# Patient Record
Sex: FEMALE | Race: WHITE | NOT HISPANIC OR LATINO | Employment: OTHER | URBAN - NONMETROPOLITAN AREA
[De-identification: names, ages, dates, MRNs, and addresses within clinical notes are randomized per-mention and may not be internally consistent; named-entity substitution may affect disease eponyms.]

---

## 2017-01-26 ENCOUNTER — OFFICE VISIT (OUTPATIENT)
Dept: CARDIOLOGY | Facility: CLINIC | Age: 52
End: 2017-01-26

## 2017-01-26 VITALS
BODY MASS INDEX: 26.66 KG/M2 | WEIGHT: 160 LBS | SYSTOLIC BLOOD PRESSURE: 110 MMHG | DIASTOLIC BLOOD PRESSURE: 72 MMHG | HEART RATE: 70 BPM | HEIGHT: 65 IN

## 2017-01-26 DIAGNOSIS — I42.1 HYPERTROPHIC OBSTRUCTIVE CARDIOMYOPATHY (HCC): ICD-10-CM

## 2017-01-26 DIAGNOSIS — R55 NEUROCARDIOGENIC SYNCOPE: ICD-10-CM

## 2017-01-26 DIAGNOSIS — Z95.810 ICD (IMPLANTABLE CARDIOVERTER-DEFIBRILLATOR) IN PLACE: Primary | ICD-10-CM

## 2017-01-26 NOTE — PROGRESS NOTES
Electrophysiology icd Check           Current Outpatient Prescriptions:   •  verapamil SR (CALAN-SR) 180 MG CR tablet, Daily., Disp: , Rfl: 0     Vitals:    01/26/17 0852   BP: 110/72   Pulse: 70        Physical Exam       Company st. eunice  Mode dddr  Lower Rate 60 bpm  Upper rate 110 bpm       Thresholds  % pacing 56  Atrial Pacing o.87 Volts @ .5 ms  Atrial Sensing >5 mV  Atrial Impedence 550 Ohms    % pacing 4  Right Ventricular Pacing 0.75 Volts @ 0.5 ms  Right Ventricular Sensing 11.6 mV  Right Ventricular Impedence 330 Ohms           Tachy Rx  VT1 190 - 222 bpm  VT2 - - - bpm  VF > 222 bpm    Charge Time 11.5 sec  Shock Impedence 55 Ohms    Battery Voltage agnes Volts  Longevity 3.2 years        Episodes ffo    Reprogramming change sensitivity to 1 on atrial lead                           Comments       Stable ICD check, minimal svt continue verapamil

## 2017-07-27 ENCOUNTER — OFFICE VISIT (OUTPATIENT)
Dept: CARDIOLOGY | Facility: CLINIC | Age: 52
End: 2017-07-27

## 2017-07-27 VITALS
HEIGHT: 65 IN | DIASTOLIC BLOOD PRESSURE: 88 MMHG | SYSTOLIC BLOOD PRESSURE: 134 MMHG | BODY MASS INDEX: 25.99 KG/M2 | WEIGHT: 156 LBS

## 2017-07-27 DIAGNOSIS — I42.1 HYPERTROPHIC OBSTRUCTIVE CARDIOMYOPATHY (HCC): Primary | ICD-10-CM

## 2017-07-27 DIAGNOSIS — I47.29 NONSUSTAINED VENTRICULAR TACHYCARDIA (HCC): ICD-10-CM

## 2017-07-27 DIAGNOSIS — R55 NEUROCARDIOGENIC SYNCOPE: ICD-10-CM

## 2017-07-27 DIAGNOSIS — I47.1 SVT (SUPRAVENTRICULAR TACHYCARDIA) (HCC): ICD-10-CM

## 2017-07-27 DIAGNOSIS — Z95.810 ICD (IMPLANTABLE CARDIOVERTER-DEFIBRILLATOR) IN PLACE: ICD-10-CM

## 2017-07-27 PROBLEM — I47.10 SVT (SUPRAVENTRICULAR TACHYCARDIA): Status: ACTIVE | Noted: 2017-07-27

## 2017-07-27 PROCEDURE — 93289 INTERROG DEVICE EVAL HEART: CPT | Performed by: INTERNAL MEDICINE

## 2017-07-27 PROCEDURE — 99213 OFFICE O/P EST LOW 20 MIN: CPT | Performed by: INTERNAL MEDICINE

## 2017-07-27 NOTE — PROGRESS NOTES
"Subjective:   Smiley Sibley  1965  [phone]      07/27/2017    [facility] CARDIOLOGY    No Known Provider  [facility] [id number]    REFERRING DOCTOR: Dr Drake      Patient ID: Smiley Sibley is a 51 y.o. female.    Chief Complaint: HOCM, ICD, SVT      No Known Allergies    Current Outpatient Prescriptions:   •  verapamil SR (CALAN-SR) 180 MG CR tablet, Daily., Disp: , Rfl: 0    History of Present Illness  Patient is a 51 year old female with history of HOCM, neurocardiogenic syncope, DDD ICE, SVT and NSVT who presents today for follow up. SVT has been controlled on Verapamil with major issues other then when she works outside and gets dehydrated and then her heart rates increase.     No issues with chest pain, shortness of breath, fevers, chills, night sweats, PND, orthopnea or palpitations. No recent ER visits or hospital stays.      The following portions of the patient's history were reviewed and updated as appropriate: allergies, current medications, past family history, past medical history, past social history, past surgical history and problem list.    ROS   14 point ROS negative except as outlined in problem list, HPI and other parts of the note.    Procedures       Objective:       Vitals:    07/27/17 0916   BP: 134/88   BP Location: Left arm   Patient Position: Sitting   Weight: 156 lb (70.8 kg)   Height: 65\" (165.1 cm)       GENERAL: Well-developed, well-nourished patient in no acute distress.  HEENT: Normocephalic, atraumatic, PERRLA. Moist mucous membranes.  NECK: No JVD present at 30°. No carotid bruits auscultated.  LUNGS: Clear to auscultation.  CARDIOVASCULAR: Heart has a regular rate and rhythm. No murmurs, gallops or rubs noted.   ABDOMEN: Soft, nontender. Positive bowel sounds.  MUSCULOSKELETAL: No gross deformities. No clubbing, cyanosis, or lower extremity edema.  SKIN: Pink, warm  Neuro: Nonfocal exam. Gait intact  Ext: No edema or " bruising    The patient's old records including ambulatory rhythm recordings (ECGs, Holter/event monitor) were reviewed and discussed.      Lab Review:   No results found for this or any previous visit.     ICD check St Jack Normal RA and RV lead function. 46% RA paced, 6 % RV  < 1% AMS no AFIB. NSVT X1 10 beats        Diagnosis:   1. Hypertrophic obstructive cardiomyopathy  2. SVT (supraventricular tachycardia)  3. Nonsustained ventricular tachycardia  4. Neurocardiogenic syncope  5. ICD (implantable cardioverter-defibrillator) in place      Assessment & Plan:   1. HOCM s/p ICD no real events noted . NSVT X1 10 beats total  2. ST stable and controlled with Verapamil. Asked patient to stay hydrated and hope this helps her palps improve. If in long run issues consider AAD vs EP study / RFA  3. ICD check normal with no real events noted  4. Follow up in 6 mths or sooner as needed         CC: Riley Nassar,   07/27/17  9:43 AM      EMR Dragon/Transcription disclaimer:  Much of this encounter note is an electronic transcription/translation of spoken language to printed text. Electronic translation of spoken language may permit erroneous, or at times, nonsensical words or phrases to be inadvertently transcribed. Although I have reviewed the note for such errors, some may still exist.

## 2018-09-05 ENCOUNTER — TELEPHONE (OUTPATIENT)
Dept: CARDIOLOGY | Facility: CLINIC | Age: 53
End: 2018-09-05

## 2018-09-05 NOTE — TELEPHONE ENCOUNTER
Pt called to report that she had gotten shocked by her defibrillator.  She is feeling ok now. She said she has a monitor but it is unplugged.  Never has transmitted.  Made appt with José Antonio Vang tomorrow to see.  Pt hasn't been seen in office since July 2017.  Instructed pt if she does get another shock tonight that she will need to go to the ER.  Pt verbalized understanding.

## 2018-09-06 ENCOUNTER — OFFICE VISIT (OUTPATIENT)
Dept: CARDIOLOGY | Facility: CLINIC | Age: 53
End: 2018-09-06

## 2018-09-06 VITALS
BODY MASS INDEX: 24.83 KG/M2 | HEIGHT: 65 IN | OXYGEN SATURATION: 99 % | WEIGHT: 149 LBS | HEART RATE: 66 BPM | SYSTOLIC BLOOD PRESSURE: 110 MMHG | DIASTOLIC BLOOD PRESSURE: 70 MMHG

## 2018-09-06 DIAGNOSIS — Z95.810 ICD (IMPLANTABLE CARDIOVERTER-DEFIBRILLATOR) IN PLACE: ICD-10-CM

## 2018-09-06 DIAGNOSIS — I47.29 NONSUSTAINED VENTRICULAR TACHYCARDIA (HCC): Primary | ICD-10-CM

## 2018-09-06 DIAGNOSIS — I42.1 HYPERTROPHIC OBSTRUCTIVE CARDIOMYOPATHY (HCC): ICD-10-CM

## 2018-09-06 DIAGNOSIS — I47.1 SVT (SUPRAVENTRICULAR TACHYCARDIA) (HCC): ICD-10-CM

## 2018-09-06 PROCEDURE — 99214 OFFICE O/P EST MOD 30 MIN: CPT | Performed by: PHYSICIAN ASSISTANT

## 2018-09-06 PROCEDURE — 93283 PRGRMG EVAL IMPLANTABLE DFB: CPT | Performed by: PHYSICIAN ASSISTANT

## 2018-09-06 RX ORDER — VERAPAMIL HYDROCHLORIDE 240 MG/1
240 TABLET, FILM COATED, EXTENDED RELEASE ORAL DAILY
Qty: 30 TABLET | Refills: 11 | Status: SHIPPED | OUTPATIENT
Start: 2018-09-06 | End: 2018-09-14 | Stop reason: HOSPADM

## 2018-09-06 NOTE — PROGRESS NOTES
[facility] at [facility]   OFFICE NOTE      Smiley Sibley  1965  PCP: Provider, No Known    SUBJECTIVE:   Smiley Sibley is a 53 y.o. female seen for a follow up visit regarding the following: ICD shocks, SVT, HOCM, and St. Jack ICD    CC:ICD Shocks    Problem List:  1. HOCM   A)St. Jack ICD placement Dr. Brandon 2010   B)Echocardiogram 2016 HOCM, Normal EF Dr. Duncan.   2. SVT   A)ICD shock 2016   B)Verapamil started 2016 with rare breakthrough episodes of SVT  3. Hx of NCS      HPI:    now presents today for follow-up regarding history of hokum, SVT, ICD shocks, and St. Jack ICD interrogation.  She is a pleasant 53-year-old female who follows with Dr. Westbrook and silvio Westfall for history of hokum.  She states she had echo last year that was acceptable we do not have records of this at this time.  She has known history of SVT and remotely has been treated with verapamil at one point she is being considered for possible ablation procedure.  She reports over the past year she had been doing well up into the past few months she's had recurrent episodes of these palpitations.  These palpitations are described as sudden onset of diffuse crisp 8 that her heartbeat takes often goes bradycardia rapid.  She is in the past used vasovagal maneuvers to ward the arrhythmias and sometimes this does work.  Recently she has 2 episodes that occurred she could not stop him from happening and she had ICD shocks.  She denies any chest pain or chest tightness going on with exertion suggesting as pectoris.  She denies any heart failure symptoms.  She denies any change in overall health reports axis she's been doing very well and eating a healthy lifestyle and exercising without any symptoms.  During the shock she did not lose consciousness or she has not described any episodes of syncope events.  She presents today for further evaluation.        ROS:  Review of Symptoms:  General: no recent weight loss/gain,  weakness or fatigue  Skin: no rashes, lumps, or other skin changes  HEENT: no dizziness, lightheadedness, or vision changes  Respiratory: no cough or hemoptysis  Cardiovascular: + palpitations, and tachycardia  Gastrointestinal: no black/tarry stools or diarrhea  Urinary: no change in frequency or urgency  Peripheral Vascular: no claudication or leg cramps  Musculoskeletal: no muscle or joint pain/stiffness  Psychiatric: no depression or excessive stress  Neurological: no sensory or motor loss, no syncope  Hematologic: no anemia, easy bruising or bleeding  Endocrine: no thyroid problems, nor heat or cold intolerance    Cardiac PMH: (Old records have been reviewed and summarized below)      Past Medical History, Past Surgical History, Family history, Social History, and Medications were all reviewed with the patient today and updated as necessary.       Current Outpatient Prescriptions:   •  verapamil SR (CALAN-SR) 180 MG CR tablet, Daily., Disp: , Rfl: 0      No Known Allergies  Patient Active Problem List   Diagnosis   • Nonsustained ventricular tachycardia (CMS/HCC)   • Neurocardiogenic syncope   • Hypertrophic obstructive cardiomyopathy (CMS/HCC)   • Anxiety and depression   • Migraine headache   • ICD (implantable cardioverter-defibrillator) in place   • SVT (supraventricular tachycardia) (CMS/HCC)     Past Medical History:   Diagnosis Date   • Anxiety and depression 10/21/2016   • Hypertrophic obstructive cardiomyopathy (CMS/HCC) 10/21/2016   • Migraine headache 10/21/2016   • Neurocardiogenic syncope 10/21/2016   • Nonsustained ventricular tachycardia (CMS/HCC) 10/21/2016     Past Surgical History:   Procedure Laterality Date   • DILATATION AND CURETTAGE     • HYSTEROTOMY       No family history on file.  Social History   Substance Use Topics   • Smoking status: Never Smoker   • Smokeless tobacco: Not on file   • Alcohol use 0.6 - 1.2 oz/week     1 - 2 Glasses of wine per week      Comment: occas  "        PHYSICAL EXAM:    /70 (BP Location: Left arm, Patient Position: Sitting)   Pulse 66   Ht 165.1 cm (65\")   Wt 67.6 kg (149 lb)   SpO2 99%   BMI 24.79 kg/m²        Wt Readings from Last 5 Encounters:   09/06/18 67.6 kg (149 lb)   07/27/17 70.8 kg (156 lb)   01/26/17 72.6 kg (160 lb)       BP Readings from Last 5 Encounters:   09/06/18 110/70   07/27/17 134/88   01/26/17 110/72   11/03/16 105/70       General appearance - Alert, well appearing, and in no distress   Mental status - Affect appropriate to mood.  Eyes - Sclerae anicteric,  ENMT - Hearing grossly normal bilaterally, Dental hygiene good.  Neck - Carotids upstroke normal bilaterally, no bruits, no JVD.  Resp - Clear to auscultation, no wheezes, rales or rhonchi, symmetric air entry.  Heart - Normal rate, regular rhythm, normal S1, S2, no murmurs, rubs, clicks or gallops.  GI - Soft, nontender, nondistended, no masses or organomegaly.  Neurological - Grossly intact - normal speech, no focal findings  Musculoskeletal - No joint tenderness, deformity or swelling, no muscular tenderness noted.  Extremities - Peripheral pulses normal, no pedal edema, no clubbing or cyanosis.  Skin - Normal coloration and turgor.  Psych -  oriented to person, place, and time.    Medical problems and test results were reviewed with the patient today.     No results found for this or any previous visit (from the past 672 hour(s)).      EKG: (EKG has been independently visualized by me and summarized below)    ECG 12 Lead  Date/Time: 9/6/2018 4:27 PM  Performed by: TAMIKA MCCARTY  Authorized by: TAMIKA MCCARTY   Comparison: compared with previous ECG from 11/3/2016  Similar to previous ECG  Rhythm: sinus rhythm and paced  Rate: normal  Conduction: conduction normal  QRS axis: normal  Clinical impression: abnormal ECG            Device Interrogation:  Dual-chamber ICD. DDDR.  A paced 47%.  RV paced 7.8%.  P wave > 5.0 mV.  R wave 11.6 mV.  Atrial threshold " 0.7 V at 0.5 ms.  RV threshold 0.7 V at 0.5 ms.  Atrial impedance 460 ohms.  RV impedance 380 ohms.  Less 1% mode switch.  Batter voltage 23% with 1.9 years remaining.   Charge time 9.5 seconds.  Charge impedance 11 ohms.  She has had multiple episodes of SVT suggesting AVNRT.  She has received 2 shocks secondary to SVT. VT zone was lowered to 170 for improved tracking.     ASSESSMENT   1. 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  Will continue verapamil increased dose to 240 mg daily if blood pressure allows.  2. HOCM:  Obtain most recent echocardiogram results from Dr. Vuong's office.  She reports that her last echo was stated to be unchanged from previous echo 2016.  3. NCS:  No recurrent events of syncope.  Increase fluid intake, hydration  4. St. Jack ICD: Normal function.     PLAN  · We discussed the findings on the ICD interrogation revealing multiple episodes SVT.  The interrogation was reviewed with Dr. Nassar.  We offered the patient option of considering changing medication from verapamil to sotalol to reduce the events of recurrent SVT and ICD shocks.  We also offered her the option of considering EP study and ablation of SVT.  Risk and benefits of the procedures point detail to the patient.  She would like to be scheduled as soon as possible.  In the event she has recurrent shocks and recurrent SVT events we will discontinue verapamil and initiate sotalol 80 mg every 12 hours with a follow-up EKG in 48 hours. For now we will titrate Verapamil to 240 mg daily.   · Patient was scheduled for EP study and ablation of SVT as soon as possible.    9/6/2018  11:56 AM    Will Taj SEPULVEDA

## 2018-09-13 ENCOUNTER — HOSPITAL ENCOUNTER (OUTPATIENT)
Facility: HOSPITAL | Age: 53
Discharge: HOME | End: 2018-09-14
Attending: INTERNAL MEDICINE | Admitting: PHYSICIAN ASSISTANT

## 2018-09-13 LAB
ANION GAP SERPL CALCULATED.3IONS-SCNC: 4 MMOL/L (ref 3–11)
B-HCG UR QL: NEGATIVE
BUN BLD-MCNC: 18 MG/DL (ref 9–23)
BUN/CREAT SERPL: 19.8 (ref 7–25)
CALCIUM SPEC-SCNC: 9.6 MG/DL (ref 8.7–10.4)
CHLORIDE SERPL-SCNC: 106 MMOL/L (ref 99–109)
CO2 SERPL-SCNC: 30 MMOL/L (ref 20–31)
CREAT BLD-MCNC: 0.91 MG/DL (ref 0.6–1.3)
DEPRECATED RDW RBC AUTO: 43.4 FL (ref 37–54)
ERYTHROCYTE [DISTWIDTH] IN BLOOD BY AUTOMATED COUNT: 12.9 % (ref 11.3–14.5)
GFR SERPL CREATININE-BSD FRML MDRD: 65 ML/MIN/1.73
GLUCOSE BLD-MCNC: 100 MG/DL (ref 70–100)
HCT VFR BLD AUTO: 40.2 % (ref 34.5–44)
HGB BLD-MCNC: 13.1 G/DL (ref 11.5–15.5)
MCH RBC QN AUTO: 29.9 PG (ref 27–31)
MCHC RBC AUTO-ENTMCNC: 32.6 G/DL (ref 32–36)
MCV RBC AUTO: 91.8 FL (ref 80–99)
PLATELET # BLD AUTO: 264 10*3/MM3 (ref 150–450)
PMV BLD AUTO: 11.2 FL (ref 6–12)
POTASSIUM BLD-SCNC: 4 MMOL/L (ref 3.5–5.5)
RBC # BLD AUTO: 4.38 10*6/MM3 (ref 3.89–5.14)
SODIUM BLD-SCNC: 140 MMOL/L (ref 132–146)
WBC NRBC COR # BLD: 9.99 10*3/MM3 (ref 3.5–10.8)

## 2018-09-13 PROCEDURE — C1894 INTRO/SHEATH, NON-LASER: HCPCS | Performed by: INTERNAL MEDICINE

## 2018-09-13 PROCEDURE — 93287 PERI-PX DEVICE EVAL & PRGR: CPT | Performed by: INTERNAL MEDICINE

## 2018-09-13 PROCEDURE — C1730 CATH, EP, 19 OR FEW ELECT: HCPCS | Performed by: INTERNAL MEDICINE

## 2018-09-13 PROCEDURE — 85027 COMPLETE CBC AUTOMATED: CPT | Performed by: INTERNAL MEDICINE

## 2018-09-13 PROCEDURE — C1759 CATH, INTRA ECHOCARDIOGRAPHY: HCPCS | Performed by: INTERNAL MEDICINE

## 2018-09-13 PROCEDURE — 93613 INTRACARDIAC EPHYS 3D MAPG: CPT | Performed by: INTERNAL MEDICINE

## 2018-09-13 PROCEDURE — 25010000002 MIDAZOLAM PER 1 MG: Performed by: INTERNAL MEDICINE

## 2018-09-13 PROCEDURE — 94799 UNLISTED PULMONARY SVC/PX: CPT

## 2018-09-13 PROCEDURE — 93621 COMP EP EVL L PAC&REC C SINS: CPT | Performed by: INTERNAL MEDICINE

## 2018-09-13 PROCEDURE — 94760 N-INVAS EAR/PLS OXIMETRY 1: CPT

## 2018-09-13 PROCEDURE — 80048 BASIC METABOLIC PNL TOTAL CA: CPT | Performed by: INTERNAL MEDICINE

## 2018-09-13 PROCEDURE — 93623 PRGRMD STIMJ&PACG IV RX NFS: CPT | Performed by: INTERNAL MEDICINE

## 2018-09-13 PROCEDURE — G0378 HOSPITAL OBSERVATION PER HR: HCPCS

## 2018-09-13 PROCEDURE — 25010000002 FENTANYL CITRATE (PF) 100 MCG/2ML SOLUTION: Performed by: INTERNAL MEDICINE

## 2018-09-13 PROCEDURE — 81025 URINE PREGNANCY TEST: CPT | Performed by: INTERNAL MEDICINE

## 2018-09-13 PROCEDURE — 93653 COMPRE EP EVAL TX SVT: CPT | Performed by: INTERNAL MEDICINE

## 2018-09-13 PROCEDURE — 94660 CPAP INITIATION&MGMT: CPT

## 2018-09-13 PROCEDURE — 36415 COLL VENOUS BLD VENIPUNCTURE: CPT

## 2018-09-13 PROCEDURE — C1732 CATH, EP, DIAG/ABL, 3D/VECT: HCPCS | Performed by: INTERNAL MEDICINE

## 2018-09-13 PROCEDURE — 25010000002 ONDANSETRON PER 1 MG: Performed by: INTERNAL MEDICINE

## 2018-09-13 RX ORDER — MIDAZOLAM HYDROCHLORIDE 1 MG/ML
INJECTION INTRAMUSCULAR; INTRAVENOUS AS NEEDED
Status: DISCONTINUED | OUTPATIENT
Start: 2018-09-13 | End: 2018-09-13 | Stop reason: HOSPADM

## 2018-09-13 RX ORDER — ONDANSETRON 2 MG/ML
4 INJECTION INTRAMUSCULAR; INTRAVENOUS EVERY 6 HOURS PRN
Status: DISCONTINUED | OUTPATIENT
Start: 2018-09-13 | End: 2018-09-14 | Stop reason: HOSPADM

## 2018-09-13 RX ORDER — SOTALOL HYDROCHLORIDE 80 MG/1
80 TABLET ORAL EVERY 12 HOURS SCHEDULED
Status: DISCONTINUED | OUTPATIENT
Start: 2018-09-13 | End: 2018-09-14 | Stop reason: HOSPADM

## 2018-09-13 RX ORDER — LIDOCAINE HYDROCHLORIDE 10 MG/ML
INJECTION, SOLUTION INFILTRATION; PERINEURAL AS NEEDED
Status: DISCONTINUED | OUTPATIENT
Start: 2018-09-13 | End: 2018-09-13 | Stop reason: HOSPADM

## 2018-09-13 RX ORDER — ONDANSETRON 2 MG/ML
INJECTION INTRAMUSCULAR; INTRAVENOUS AS NEEDED
Status: DISCONTINUED | OUTPATIENT
Start: 2018-09-13 | End: 2018-09-13 | Stop reason: HOSPADM

## 2018-09-13 RX ORDER — ACETAMINOPHEN 325 MG/1
650 TABLET ORAL EVERY 4 HOURS PRN
Status: DISCONTINUED | OUTPATIENT
Start: 2018-09-13 | End: 2018-09-13 | Stop reason: HOSPADM

## 2018-09-13 RX ORDER — SODIUM CHLORIDE 0.9 % (FLUSH) 0.9 %
1-10 SYRINGE (ML) INJECTION AS NEEDED
Status: DISCONTINUED | OUTPATIENT
Start: 2018-09-13 | End: 2018-09-13 | Stop reason: HOSPADM

## 2018-09-13 RX ORDER — OXYCODONE HYDROCHLORIDE AND ACETAMINOPHEN 5; 325 MG/1; MG/1
1 TABLET ORAL EVERY 4 HOURS PRN
Status: DISCONTINUED | OUTPATIENT
Start: 2018-09-13 | End: 2018-09-14 | Stop reason: HOSPADM

## 2018-09-13 RX ORDER — NITROGLYCERIN 0.4 MG/1
0.4 TABLET SUBLINGUAL
Status: DISCONTINUED | OUTPATIENT
Start: 2018-09-13 | End: 2018-09-13 | Stop reason: HOSPADM

## 2018-09-13 RX ORDER — SODIUM CHLORIDE 9 MG/ML
INJECTION, SOLUTION INTRAVENOUS CONTINUOUS PRN
Status: COMPLETED | OUTPATIENT
Start: 2018-09-13 | End: 2018-09-13

## 2018-09-13 RX ORDER — PROMETHAZINE HYDROCHLORIDE 25 MG/ML
12.5 INJECTION, SOLUTION INTRAMUSCULAR; INTRAVENOUS EVERY 4 HOURS PRN
Status: DISCONTINUED | OUTPATIENT
Start: 2018-09-13 | End: 2018-09-13 | Stop reason: HOSPADM

## 2018-09-13 RX ORDER — FENTANYL CITRATE 50 UG/ML
INJECTION, SOLUTION INTRAMUSCULAR; INTRAVENOUS AS NEEDED
Status: DISCONTINUED | OUTPATIENT
Start: 2018-09-13 | End: 2018-09-13 | Stop reason: HOSPADM

## 2018-09-13 NOTE — H&P (VIEW-ONLY)
[facility] at [facility]   OFFICE NOTE      Smiley Sibley  1965  PCP: Provider, No Known    SUBJECTIVE:   Smiley Sibley is a 53 y.o. female seen for a follow up visit regarding the following: ICD shocks, SVT, HOCM, and St. Jack ICD    CC:ICD Shocks    Problem List:  1. HOCM   A)St. Jack ICD placement Dr. Brandon 2010   B)Echocardiogram 2016 HOCM, Normal EF Dr. Duncan.   2. SVT   A)ICD shock 2016   B)Verapamil started 2016 with rare breakthrough episodes of SVT  3. Hx of NCS      HPI:    now presents today for follow-up regarding history of hokum, SVT, ICD shocks, and St. Jack ICD interrogation.  She is a pleasant 53-year-old female who follows with Dr. Westbrook and silvio Westfall for history of hokum.  She states she had echo last year that was acceptable we do not have records of this at this time.  She has known history of SVT and remotely has been treated with verapamil at one point she is being considered for possible ablation procedure.  She reports over the past year she had been doing well up into the past few months she's had recurrent episodes of these palpitations.  These palpitations are described as sudden onset of diffuse crisp 8 that her heartbeat takes often goes bradycardia rapid.  She is in the past used vasovagal maneuvers to ward the arrhythmias and sometimes this does work.  Recently she has 2 episodes that occurred she could not stop him from happening and she had ICD shocks.  She denies any chest pain or chest tightness going on with exertion suggesting as pectoris.  She denies any heart failure symptoms.  She denies any change in overall health reports axis she's been doing very well and eating a healthy lifestyle and exercising without any symptoms.  During the shock she did not lose consciousness or she has not described any episodes of syncope events.  She presents today for further evaluation.        ROS:  Review of Symptoms:  General: no recent weight loss/gain,  weakness or fatigue  Skin: no rashes, lumps, or other skin changes  HEENT: no dizziness, lightheadedness, or vision changes  Respiratory: no cough or hemoptysis  Cardiovascular: + palpitations, and tachycardia  Gastrointestinal: no black/tarry stools or diarrhea  Urinary: no change in frequency or urgency  Peripheral Vascular: no claudication or leg cramps  Musculoskeletal: no muscle or joint pain/stiffness  Psychiatric: no depression or excessive stress  Neurological: no sensory or motor loss, no syncope  Hematologic: no anemia, easy bruising or bleeding  Endocrine: no thyroid problems, nor heat or cold intolerance    Cardiac PMH: (Old records have been reviewed and summarized below)      Past Medical History, Past Surgical History, Family history, Social History, and Medications were all reviewed with the patient today and updated as necessary.       Current Outpatient Prescriptions:   •  verapamil SR (CALAN-SR) 180 MG CR tablet, Daily., Disp: , Rfl: 0      No Known Allergies  Patient Active Problem List   Diagnosis   • Nonsustained ventricular tachycardia (CMS/HCC)   • Neurocardiogenic syncope   • Hypertrophic obstructive cardiomyopathy (CMS/HCC)   • Anxiety and depression   • Migraine headache   • ICD (implantable cardioverter-defibrillator) in place   • SVT (supraventricular tachycardia) (CMS/HCC)     Past Medical History:   Diagnosis Date   • Anxiety and depression 10/21/2016   • Hypertrophic obstructive cardiomyopathy (CMS/HCC) 10/21/2016   • Migraine headache 10/21/2016   • Neurocardiogenic syncope 10/21/2016   • Nonsustained ventricular tachycardia (CMS/HCC) 10/21/2016     Past Surgical History:   Procedure Laterality Date   • DILATATION AND CURETTAGE     • HYSTEROTOMY       No family history on file.  Social History   Substance Use Topics   • Smoking status: Never Smoker   • Smokeless tobacco: Not on file   • Alcohol use 0.6 - 1.2 oz/week     1 - 2 Glasses of wine per week      Comment: occas  "        PHYSICAL EXAM:    /70 (BP Location: Left arm, Patient Position: Sitting)   Pulse 66   Ht 165.1 cm (65\")   Wt 67.6 kg (149 lb)   SpO2 99%   BMI 24.79 kg/m²        Wt Readings from Last 5 Encounters:   09/06/18 67.6 kg (149 lb)   07/27/17 70.8 kg (156 lb)   01/26/17 72.6 kg (160 lb)       BP Readings from Last 5 Encounters:   09/06/18 110/70   07/27/17 134/88   01/26/17 110/72   11/03/16 105/70       General appearance - Alert, well appearing, and in no distress   Mental status - Affect appropriate to mood.  Eyes - Sclerae anicteric,  ENMT - Hearing grossly normal bilaterally, Dental hygiene good.  Neck - Carotids upstroke normal bilaterally, no bruits, no JVD.  Resp - Clear to auscultation, no wheezes, rales or rhonchi, symmetric air entry.  Heart - Normal rate, regular rhythm, normal S1, S2, no murmurs, rubs, clicks or gallops.  GI - Soft, nontender, nondistended, no masses or organomegaly.  Neurological - Grossly intact - normal speech, no focal findings  Musculoskeletal - No joint tenderness, deformity or swelling, no muscular tenderness noted.  Extremities - Peripheral pulses normal, no pedal edema, no clubbing or cyanosis.  Skin - Normal coloration and turgor.  Psych -  oriented to person, place, and time.    Medical problems and test results were reviewed with the patient today.     No results found for this or any previous visit (from the past 672 hour(s)).      EKG: (EKG has been independently visualized by me and summarized below)    ECG 12 Lead  Date/Time: 9/6/2018 4:27 PM  Performed by: TAMIKA MCCARTY  Authorized by: TAMIKA MCCARTY   Comparison: compared with previous ECG from 11/3/2016  Similar to previous ECG  Rhythm: sinus rhythm and paced  Rate: normal  Conduction: conduction normal  QRS axis: normal  Clinical impression: abnormal ECG            Device Interrogation:  Dual-chamber ICD. DDDR.  A paced 47%.  RV paced 7.8%.  P wave > 5.0 mV.  R wave 11.6 mV.  Atrial threshold " 0.7 V at 0.5 ms.  RV threshold 0.7 V at 0.5 ms.  Atrial impedance 460 ohms.  RV impedance 380 ohms.  Less 1% mode switch.  Batter voltage 23% with 1.9 years remaining.   Charge time 9.5 seconds.  Charge impedance 11 ohms.  She has had multiple episodes of SVT suggesting AVNRT.  She has received 2 shocks secondary to SVT. VT zone was lowered to 170 for improved tracking.     ASSESSMENT   1. 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  Will continue verapamil increased dose to 240 mg daily if blood pressure allows.  2. HOCM:  Obtain most recent echocardiogram results from Dr. Vuong's office.  She reports that her last echo was stated to be unchanged from previous echo 2016.  3. NCS:  No recurrent events of syncope.  Increase fluid intake, hydration  4. St. Jack ICD: Normal function.     PLAN  · We discussed the findings on the ICD interrogation revealing multiple episodes SVT.  The interrogation was reviewed with Dr. Nassar.  We offered the patient option of considering changing medication from verapamil to sotalol to reduce the events of recurrent SVT and ICD shocks.  We also offered her the option of considering EP study and ablation of SVT.  Risk and benefits of the procedures point detail to the patient.  She would like to be scheduled as soon as possible.  In the event she has recurrent shocks and recurrent SVT events we will discontinue verapamil and initiate sotalol 80 mg every 12 hours with a follow-up EKG in 48 hours. For now we will titrate Verapamil to 240 mg daily.   · Patient was scheduled for EP study and ablation of SVT as soon as possible.    9/6/2018  11:56 AM    Will Taj SEPULVEDA

## 2018-09-13 NOTE — PLAN OF CARE
Problem: Patient Care Overview  Goal: Plan of Care Review   09/13/18 8826   Coping/Psychosocial   Plan of Care Reviewed With patient   Plan of Care Review   Progress improving

## 2018-09-14 VITALS
OXYGEN SATURATION: 98 % | DIASTOLIC BLOOD PRESSURE: 70 MMHG | RESPIRATION RATE: 20 BRPM | WEIGHT: 150.57 LBS | BODY MASS INDEX: 25.09 KG/M2 | HEIGHT: 65 IN | SYSTOLIC BLOOD PRESSURE: 107 MMHG | HEART RATE: 70 BPM | TEMPERATURE: 98.4 F

## 2018-09-14 PROCEDURE — 93010 ELECTROCARDIOGRAM REPORT: CPT | Performed by: INTERNAL MEDICINE

## 2018-09-14 PROCEDURE — 93005 ELECTROCARDIOGRAM TRACING: CPT | Performed by: INTERNAL MEDICINE

## 2018-09-14 PROCEDURE — 99217 PR OBSERVATION CARE DISCHARGE MANAGEMENT: CPT | Performed by: INTERNAL MEDICINE

## 2018-09-14 PROCEDURE — G0378 HOSPITAL OBSERVATION PER HR: HCPCS

## 2018-09-14 RX ORDER — SOTALOL HYDROCHLORIDE 80 MG/1
80 TABLET ORAL EVERY 12 HOURS SCHEDULED
Qty: 60 TABLET | Refills: 5 | Status: SHIPPED | OUTPATIENT
Start: 2018-09-14 | End: 2022-12-28 | Stop reason: SDUPTHER

## 2018-09-14 RX ADMIN — SOTALOL HYDROCHLORIDE 80 MG: 80 TABLET ORAL at 08:36

## 2018-09-14 NOTE — PLAN OF CARE
Problem: Patient Care Overview  Goal: Plan of Care Review   09/14/18 0811   Coping/Psychosocial   Plan of Care Reviewed With patient   Plan of Care Review   Progress improving

## 2018-09-14 NOTE — PROGRESS NOTES
[facility] at [facility]  Cardiovascular Progress Note  Smiley Sibley  [id number]  1965    DATE OF ADMISSION: 9/13/2018  DATE OF FOLLOW UP:  9/14/18     Provider, No Known    Subjective:     Patient ID: Smiley Sibley is a 53 y.o. female.    Chief Complaint: SVT f/u     No Known Allergies    Current Facility-Administered Medications:   •  ondansetron (ZOFRAN) injection 4 mg, 4 mg, Intravenous, Q6H PRN, Maday, Maximino, DO  •  oxyCODONE-acetaminophen (PERCOCET) 5-325 MG per tablet 1 tablet, 1 tablet, Oral, Q4H PRN, Maday, Maximino, DO  •  sotalol (BETAPACE) tablet 80 mg, 80 mg, Oral, Q12H, Maday, Maximino, DO    History of Present Illness    Patient has not complaints this morning. She has ambulated with no issues. Sotalol was held last night due to hypotension.     ROS   14 point ROS negative except as outlined in problem list, HPI and other parts of the note.    Procedures       Objective:       Vitals:    09/13/18 1815 09/13/18 1949 09/13/18 2045 09/14/18 0309   BP: 95/59 94/52 98/57 100/67   BP Location:       Patient Position:       Pulse: 69 62 64 59   Resp:  20  20   Temp:  99 °F (37.2 °C)  99 °F (37.2 °C)   TempSrc:  Oral  Oral   SpO2: 95%      Weight:       Height:           Intake/Output Summary (Last 24 hours) at 09/14/18 0746  Last data filed at 09/14/18 0309   Gross per 24 hour   Intake              480 ml   Output                0 ml   Net              480 ml       GENERAL: Well-developed, well-nourished patient in no acute distress.  HEENT: Normocephalic, atraumatic, PERRLA. Moist mucous membranes.  NECK: No JVD present at 30°. No carotid bruits auscultated. R IJ with no hematoma   LUNGS: Clear to auscultation.  CARDIOVASCULAR: Heart has a regular rate and rhythm. No murmurs, gallops or rubs noted.   ABDOMEN: Soft, nontender. Positive bowel sounds.  MUSCULOSKELETAL: No gross deformities. No clubbing, cyanosis; R groin site with no hematoma.   EXT: pulses intact, no  swelling  SKIN: Pink, warm  Neuro: Nonfocal exam. Gait intact    The patient's old records including ambulatory rhythm recordings (ECGs, Holter/event monitor) were reviewed and discussed.      Lab Review:     Results from last 7 days  Lab Units 09/13/18  1046   SODIUM mmol/L 140   POTASSIUM mmol/L 4.0   CHLORIDE mmol/L 106   CO2 mmol/L 30.0   BUN mg/dL 18   CREATININE mg/dL 0.91   GLUCOSE mg/dL 100   CALCIUM mg/dL 9.6           Results from last 7 days  Lab Units 09/13/18  1046   WBC 10*3/mm3 9.99   HEMOGLOBIN g/dL 13.1   HEMATOCRIT % 40.2   PLATELETS 10*3/mm3 264         Assessment & Plan:     1. SVT/aflutter:  Recurrent episodes of SVT resulting in ICD shocks.   - S/p EPS with successful RFA of typical right atrial flutter. No ventricular arrhythmias induced   - Initiated on Sotalol 80mg BID yesterday and evening dose was held due to hypotension. Goal was to increase to 120mg BID but will be limited with BP. QTc is stable.     2. HOCM s/p ICD implant. No ventricular arrhythmias during EPS.     3. NCS:  No recurrent events of syncope.  Increase fluid intake, hydration    Start on Sotalol 80 mg BID this am, DC Verapamil. If no issues then will DC home about Noon. Xarelto 20 mg daily for 3 weeks then can stop.        Patient will be discharged home in stable condition. Follow-up in 10-12 weeks.       GREGORY Shell  09/14/18  7:46 AM    I, Maximino Nassar, have reviewed the note in full and agree with all aspects of the above including physical exam, assessment, labs and plan with changes made accordingly. Face to Face Time was spent with the patient.    Maximino Nassar DO  09/14/18  8:50 AM

## 2018-09-14 NOTE — PLAN OF CARE
Problem: Patient Care Overview  Goal: Plan of Care Review  Outcome: Ongoing (interventions implemented as appropriate)   09/14/18 0430   Coping/Psychosocial   Plan of Care Reviewed With patient   OTHER   Outcome Summary VSS. Paced on monitor. No complaints of pain or SOA at this time. Sotalol held r/t SBP 90s. Will continue to monitor.      Goal: Interprofessional Rounds/Family Conf  Outcome: Ongoing (interventions implemented as appropriate)

## 2018-09-14 NOTE — PROGRESS NOTES
Discharge Planning Assessment  [facility]     Patient Name: Smiley Sibley  MRN: [id number]  Today's Date: 9/14/2018    Admit Date: 9/13/2018          Discharge Needs Assessment     Row Name 09/14/18 0856       Living Environment    Lives With spouse    Name(s) of Who Lives With Patient Levar Sibley    Current Living Arrangements home/apartment/condo    Primary Care Provided by self    Provides Primary Care For no one    Family Caregiver if Needed spouse    Family Caregiver Names Levar Sibley    Quality of Family Relationships helpful;involved    Able to Return to Prior Arrangements yes       Resource/Environmental Concerns    Resource/Environmental Concerns none       Transition Planning    Patient/Family Anticipates Transition to home with family    Patient/Family Anticipated Services at Transition none    Transportation Anticipated family or friend will provide       Discharge Needs Assessment    Readmission Within the Last 30 Days no previous admission in last 30 days    Concerns to be Addressed no discharge needs identified;denies needs/concerns at this time    Equipment Currently Used at Home none    Anticipated Changes Related to Illness none    Equipment Needed After Discharge none            Discharge Plan     Row Name 09/14/18 0857       Plan    Plan home    Patient/Family in Agreement with Plan yes    Plan Comments Pt lives in [facility] with her . She is independent with all ADLs, denies use of any DME/HH. Pt reports her medications are covered by insurance. She reports her plan for discharge is to return home and denies all discharge needs.     Final Discharge Disposition Code 01 - home or self-care    Final Note home        Destination     No service coordination in this encounter.      Durable Medical Equipment     No service coordination in this encounter.      Dialysis/Infusion     No service coordination in this encounter.      Home Medical Care     No service coordination in this encounter.       Social Care     No service coordination in this encounter.                Demographic Summary     Row Name 09/14/18 0856       General Information    Admission Type observation    Referral Source physician    Reason for Consult discharge planning       Contact Information    Permission Granted to Share Info With ;family/designee    Contact Information Comments Levar Sibley  [phone]            Functional Status     Row Name 09/14/18 0856       Functional Status, IADL    Medications independent    Meal Preparation independent    Housekeeping independent    Laundry independent    Shopping independent       Mental Status    General Appearance WDL WDL       Mental Status Summary    Recent Changes in Mental Status/Cognitive Functioning no changes            Psychosocial    No documentation.           Abuse/Neglect    No documentation.           Legal    No documentation.           Substance Abuse    No documentation.           Patient Forms    No documentation.         Misty Hernadez RN

## 2018-12-17 ENCOUNTER — OFFICE VISIT (OUTPATIENT)
Dept: CARDIOLOGY | Facility: CLINIC | Age: 53
End: 2018-12-17

## 2018-12-17 VITALS
DIASTOLIC BLOOD PRESSURE: 72 MMHG | HEART RATE: 63 BPM | SYSTOLIC BLOOD PRESSURE: 112 MMHG | BODY MASS INDEX: 23.53 KG/M2 | WEIGHT: 141.2 LBS | HEIGHT: 65 IN

## 2018-12-17 DIAGNOSIS — Z95.810 ICD (IMPLANTABLE CARDIOVERTER-DEFIBRILLATOR) IN PLACE: ICD-10-CM

## 2018-12-17 DIAGNOSIS — I47.29 NONSUSTAINED VENTRICULAR TACHYCARDIA (HCC): ICD-10-CM

## 2018-12-17 DIAGNOSIS — I47.1 SVT (SUPRAVENTRICULAR TACHYCARDIA) (HCC): Primary | ICD-10-CM

## 2018-12-17 DIAGNOSIS — I42.1 HYPERTROPHIC OBSTRUCTIVE CARDIOMYOPATHY (HCC): ICD-10-CM

## 2018-12-17 PROCEDURE — 93283 PRGRMG EVAL IMPLANTABLE DFB: CPT | Performed by: INTERNAL MEDICINE

## 2018-12-17 PROCEDURE — 99213 OFFICE O/P EST LOW 20 MIN: CPT | Performed by: INTERNAL MEDICINE

## 2018-12-17 NOTE — PROGRESS NOTES
Subjective:   Smiley Sibley  1965    There is no work phone number on file.      12/17/2018    [facility]    Chloe Childers, [place]  [address] [id number]    REFERRING DOCTOR: LIAT      Patient ID: Smiley Sibley is a 53 y.o. female.    Chief Complaint:   Chief Complaint   Patient presents with   • Cardiomyopathy     Problem List:  1. HOCM              A)St. Jack ICD placement Dr. Brandon 2010              B)Echocardiogram 2016 HOCM, Normal EF Dr. Duncan.   2. SVT              A)ICD shock 2016              B)Verapamil started 2016 with rare breakthrough episodes of SVT              C/ S/p Typical Aflutter ablation 9/2018  3. Hx of NCS         No Known Allergies    Current Outpatient Medications:   •  sotalol (BETAPACE) 80 MG tablet, Take 1 tablet by mouth Every 12 (Twelve) Hours., Disp: 60 tablet, Rfl: 5    History of Present Illness  Patient for follow-up regarding history of HOCM, SVT, ICD shocks, and St. Jack ICD interrogation.  She is a pleasant 53-year-old female who follows with Dr. Westbrook and silvio Westfall for history of HOCM. Now s/p Aflutter ablation in Sept 2018 and doing better since that time and still at times getting some palpitations with couple times a week and lasting 30 secs to a minute.   She states she had echo last year that was acceptable we do not have records of this at this time.She had 2 episodes that occurred she could not stop him from happening and she had ICD shocks in August 2018.  She denies any chest pain or chest tightness going on with exertion suggesting as pectoris.  She denies any heart failure symptoms.  She denies any change in overall health reports axis she's been doing very well and eating a healthy lifestyle and exercising without any symptoms.  During the shock she did not lose consciousness or she has not described any episodes of syncope events.  Also, dealing with some anxiety since worried at times of getting  "shocked again.         No issues with chest pain, shortness of breath, fevers, chills, night sweats, PND, orthopnea. No recent ER visits or hospital stays.      The following portions of the patient's history were reviewed and updated as appropriate: allergies, current medications, past family history, past medical history, past social history, past surgical history and problem list.    ROS   14 point ROS negative except as outlined in problem list, HPI and other parts of the note.    Procedures       Objective:       Vitals:    12/17/18 1434   BP: 112/72   BP Location: Left arm   Patient Position: Sitting   Pulse: 63   Weight: 64 kg (141 lb 3.2 oz)   Height: 165.1 cm (65\")       GENERAL: Well-developed, well-nourished patient in no acute distress.  HEENT: Normocephalic, atraumatic, PERRLA. Moist mucous membranes.  NECK: No JVD present at 30°. No carotid bruits auscultated.  LUNGS: Clear to auscultation.  CARDIOVASCULAR: Heart has a regular rate and rhythm. No murmurs, gallops or rubs noted.   ABDOMEN: Soft, nontender. Positive bowel sounds.  MUSCULOSKELETAL: No gross deformities. No clubbing, cyanosis, or lower extremity edema.  SKIN: Pink, warm  Neuro: Nonfocal exam. Gait intact  Ext: No edema or bruising  ICD site ok    The patient's old records including ambulatory rhythm recordings (ECGs, Holter/event monitor) were reviewed and discussed.      Lab Review:   Results for orders placed or performed during the hospital encounter of 09/13/18   CBC (No Diff)   Result Value Ref Range    WBC 9.99 3.50 - 10.80 10*3/mm3    RBC 4.38 3.89 - 5.14 10*6/mm3    Hemoglobin 13.1 11.5 - 15.5 g/dL    Hematocrit 40.2 34.5 - 44.0 %    MCV 91.8 80.0 - 99.0 fL    MCH 29.9 27.0 - 31.0 pg    MCHC 32.6 32.0 - 36.0 g/dL    RDW 12.9 11.3 - 14.5 %    RDW-SD 43.4 37.0 - 54.0 fl    MPV 11.2 6.0 - 12.0 fL    Platelets 264 150 - 450 10*3/mm3   Basic Metabolic Panel   Result Value Ref Range    Glucose 100 70 - 100 mg/dL    BUN 18 9 - 23 mg/dL    " Creatinine 0.91 0.60 - 1.30 mg/dL    Sodium 140 132 - 146 mmol/L    Potassium 4.0 3.5 - 5.5 mmol/L    Chloride 106 99 - 109 mmol/L    CO2 30.0 20.0 - 31.0 mmol/L    Calcium 9.6 8.7 - 10.4 mg/dL    eGFR Non African Amer 65 >60 mL/min/1.73    BUN/Creatinine Ratio 19.8 7.0 - 25.0    Anion Gap 4.0 3.0 - 11.0 mmol/L   Pregnancy, Urine - Urine, Clean Catch   Result Value Ref Range    HCG, Urine QL Negative Negative     PROCEDURE EP 9/13/2018  1. SVT, typical isthmus dependent counterclockwise right atrial flutter with radiofrequency ablation along the CTI line with bidirectional block. The typical appearing atrial flutter that I induced was at a rate on 150 bpm on Isoprul and degenerated to atrial fibrillation.        2.   No ventricular arrhythmias induced with EP study.        3. DDD ICD Device Check        RECOMMENDATIONS:  1. The patient will be monitored on telemetry and if no issues will be discharged tomorrow AM  2. Start on Sotlol 80 mg BID and try to increase to 120 mg BID if can tolerate.  Discontinue verapamil.  I could not induce any atrial or ventricular arrhythmias with rapid rates up to 230 bpm that caused the patient to have inappropriate shocks.  This was even on up to 4 µg of Isuprel.  Uncertain if this was a dual tachycardia causing the shock versus atrial arrhythmias and rapid rates with inability to induce today during EP study.     ST FRACISCO DDD ICD  RA 94%, RV 25%  Normal check. < 1% AMS. NO VT, VF noted.         Diagnosis:   1. SVT (supraventricular tachycardia) (CMS/HCC)  2. Nonsustained ventricular tachycardia (CMS/HCC)  3. ICD (implantable cardioverter-defibrillator) in place  4. Hypertrophic obstructive cardiomyopathy (CMS/HCC)      Assessment & Plan:   1. SVT/Aflutter:  Recurrent episodes of SVT resulting in ICD shocks.   - S/p EPS with successful RFA of typical right atrial flutter. No ventricular arrhythmias induced   - Initiated on Sotalol 80mg BID. QTc is stable. At times with some  palpitations still but overall better. If recurrent and worsens in duration consider changing over to Tikosyn since cant tolerate sotalol 120 mg due to lower BPs     2. HOCM s/p ICD implant. No ventricular arrhythmias during EPS.      3. Neurocardiogenic Syncope:  No recurrent events of syncope.  Increase fluid intake, hydration    4. Follow up in 4 mths or sooner as needed. Ok to see Will           Maximino Nassar DO  12/17/18  3:12 PM      EMR Dragon/Transcription disclaimer:  This note was created with the use of Dragon.

## 2019-02-07 ENCOUNTER — TELEPHONE (OUTPATIENT)
Dept: CARDIOLOGY | Facility: CLINIC | Age: 54
End: 2019-02-07

## 2019-02-07 ENCOUNTER — CLINICAL SUPPORT NO REQUIREMENTS (OUTPATIENT)
Dept: CARDIOLOGY | Facility: CLINIC | Age: 54
End: 2019-02-07

## 2019-02-07 DIAGNOSIS — I42.1 HYPERTROPHIC OBSTRUCTIVE CARDIOMYOPATHY (HCC): ICD-10-CM

## 2019-02-07 DIAGNOSIS — I47.29 NONSUSTAINED VENTRICULAR TACHYCARDIA (HCC): ICD-10-CM

## 2019-02-07 PROCEDURE — 93296 REM INTERROG EVL PM/IDS: CPT | Performed by: PHYSICIAN ASSISTANT

## 2019-02-07 PROCEDURE — 93295 DEV INTERROG REMOTE 1/2/MLT: CPT | Performed by: PHYSICIAN ASSISTANT

## 2019-02-07 NOTE — TELEPHONE ENCOUNTER
Called pt due to [facility]lin home monitor didn't send scheduled reading.  Left message with my name and number for pt to return my call.

## 2019-04-22 ENCOUNTER — OFFICE VISIT (OUTPATIENT)
Dept: CARDIOLOGY | Facility: CLINIC | Age: 54
End: 2019-04-22

## 2019-04-22 VITALS
SYSTOLIC BLOOD PRESSURE: 104 MMHG | HEIGHT: 65 IN | DIASTOLIC BLOOD PRESSURE: 68 MMHG | BODY MASS INDEX: 23.49 KG/M2 | HEART RATE: 60 BPM | WEIGHT: 141 LBS

## 2019-04-22 DIAGNOSIS — I42.1 HYPERTROPHIC OBSTRUCTIVE CARDIOMYOPATHY (HCC): Primary | ICD-10-CM

## 2019-04-22 DIAGNOSIS — I47.1 SVT (SUPRAVENTRICULAR TACHYCARDIA) (HCC): ICD-10-CM

## 2019-04-22 DIAGNOSIS — Z95.810 ICD (IMPLANTABLE CARDIOVERTER-DEFIBRILLATOR) IN PLACE: ICD-10-CM

## 2019-04-22 DIAGNOSIS — I47.29 NONSUSTAINED VENTRICULAR TACHYCARDIA (HCC): ICD-10-CM

## 2019-04-22 PROCEDURE — 93280 PM DEVICE PROGR EVAL DUAL: CPT | Performed by: PHYSICIAN ASSISTANT

## 2019-04-22 PROCEDURE — 99213 OFFICE O/P EST LOW 20 MIN: CPT | Performed by: PHYSICIAN ASSISTANT

## 2019-04-22 NOTE — PROGRESS NOTES
[facility]   OFFICE NOTE      Smiley Sibely  1965  PCP: Chloe Childers PA    SUBJECTIVE:   Smiley Sibley is a 53 y.o. female seen for a follow up visit regarding the following:     CC:SVT    Problem List:  1. HOCM              A)St. Jack ICD placement Dr. Brandon 2010              B)Echocardiogram 2016 HOCM, Normal EF Dr. Duncan.   2. SVT              A)ICD shock 2016              B)Verapamil started 2016 with rare breakthrough episodes of SVT              C/ S/p Typical Aflutter ablation 9/2018  3. Hx of NCS  4. Migraine headaches.         HPI:   The patient is a pleasant 53-year-old female presents today for follow-up regarding history of SVT, remote atrial flutter ablation, neurocardiac syncope, St. Jack ICD and HOCM.  The patient had a EP study and RFA of typical atrial flutter by Dr. May September 2018.  However the patient developed recurrent SVT which resulted in an ICD shock.  In view of this finding the patient was placed on sotalol therapy.  She has been stable on the medication with no recurrent episodes of ICD shocks.  She has a chest pain or chest tightness suggesting his pectoris.  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  She denies any sudden dizziness, near syncope syncope or ICD shocks.  She reports that she does check in with Dr. Cardona's office on a regular basis and recently saw them.  She states that she does rarely have a palpitation but is very brief in nature lasting few seconds and resolves on its own.  She would like to consider coming off the sotalol if possible and has thought about this in the past.        ROS:  Review of Symptoms:  General: no recent weight loss/gain, weakness or fatigue  Skin: no rashes, lumps, or other skin changes  HEENT: no dizziness, lightheadedness, or vision changes  Respiratory: no cough or hemoptysis  Cardiovascular: + palpitations, and tachycardia  Gastrointestinal: no  black/tarry stools or diarrhea  Urinary: no change in frequency or urgency  Peripheral Vascular: no claudication or leg cramps  Musculoskeletal: no muscle or joint pain/stiffness  Psychiatric: no depression or excessive stress  Neurological: no sensory or motor loss, no syncope  Hematologic: no anemia, easy bruising or bleeding  Endocrine: no thyroid problems, nor heat or cold intolerance    Cardiac PMH: (Old records have been reviewed and summarized below)      Past Medical History, Past Surgical History, Family history, Social History, and Medications were all reviewed with the patient today and updated as necessary.       Current Outpatient Medications:   •  sotalol (BETAPACE) 80 MG tablet, Take 1 tablet by mouth Every 12 (Twelve) Hours., Disp: 60 tablet, Rfl: 5      No Known Allergies  Patient Active Problem List   Diagnosis   • Nonsustained ventricular tachycardia (CMS/HCC)   • Neurocardiogenic syncope   • Hypertrophic obstructive cardiomyopathy (CMS/HCC)   • Anxiety and depression   • Migraine headache   • ICD (implantable cardioverter-defibrillator) in place   • SVT (supraventricular tachycardia) (CMS/HCC)     Past Medical History:   Diagnosis Date   • Abnormal ECG 1998   • Anxiety and depression 10/21/2016   • Arrhythmia    • Congenital heart disease 1998   • Coronary artery disease    • Heart murmur 1965   • Hyperlipidemia 2018   • Hypertrophic obstructive cardiomyopathy (CMS/HCC) 10/21/2016   • Hypertrophic obstructive cardiomyopathy (CMS/HCC)    • Migraine headache 10/21/2016   • Mitral valve prolapse 1998   • Neurocardiogenic syncope 10/21/2016   • Nonsustained ventricular tachycardia (CMS/HCC) 10/21/2016   • Sleep apnea 2013   • Valvular disease 1998     Past Surgical History:   Procedure Laterality Date   • ABLATION OF DYSRHYTHMIC FOCUS  09/13/2018   • CARDIAC CATHETERIZATION  2010   • CARDIAC DEFIBRILLATOR PLACEMENT  07/29/2010   • CARDIAC ELECTROPHYSIOLOGY PROCEDURE N/A 9/13/2018    Procedure:  "Ablation SVT;  Surgeon: Maximino Nassar DO;  Location: [facility] INVASIVE LOC[facility];  Service: Cardiovascular   • DILATATION AND CURETTAGE     • HYSTEROTOMY     • INSERT / REPLACE / REMOVE PACEMAKER  07/29/2010     Family History   Problem Relation Age of Onset   • Arrhythmia Brother    • Heart disease Brother    • Arrhythmia Mother    • Heart disease Mother      Social History     Tobacco Use   • Smoking status: Never Smoker   • Smokeless tobacco: Never Used   Substance Use Topics   • Alcohol use: Yes     Alcohol/week: 0.6 - 1.2 oz     Types: 1 - 2 Glasses of wine per week     Comment: 3-4 times/year         PHYSICAL EXAM:    /68 (BP Location: Left arm, Patient Position: Sitting)   Pulse 60   Ht 165.1 cm (65\")   Wt 64 kg (141 lb)   BMI 23.46 kg/m²        Wt Readings from Last 5 Encounters:   04/22/19 64 kg (141 lb)   12/17/18 64 kg (141 lb 3.2 oz)   09/13/18 68.3 kg (150 lb 9.2 oz)   09/06/18 67.6 kg (149 lb)   07/27/17 70.8 kg (156 lb)       BP Readings from Last 5 Encounters:   04/22/19 104/68   12/17/18 112/72   09/14/18 107/70   09/06/18 110/70   07/27/17 134/88       General appearance - Alert, well appearing, and in no distress   Mental status - Affect appropriate to mood.  Eyes - Sclerae anicteric,  ENMT - Hearing grossly normal bilaterally, Dental hygiene good.  Neck - Carotids upstroke normal bilaterally, no bruits, no JVD.  Resp - Clear to auscultation, no wheezes, rales or rhonchi, symmetric air entry.  Heart - Normal rate, regular rhythm, normal S1, S2, no murmurs, rubs, clicks or gallops.  GI - Soft, nontender, nondistended, no masses or organomegaly.  Neurological - Grossly intact - normal speech, no focal findings  Musculoskeletal - No joint tenderness, deformity or swelling, no muscular tenderness noted.  Extremities - Peripheral pulses normal, no pedal edema, no clubbing or cyanosis.  Skin - Normal coloration and turgor.  Psych -  oriented to person, place, and time.    Medical problems " and test results were reviewed with the patient today.     No results found for this or any previous visit (from the past 672 hour(s)).      EKG: (EKG has been independently visualized by me and summarized below)    ECG 12 Lead  Date/Time: 4/22/2019 10:45 AM  Performed by: James Vang PA  Authorized by: James Vang PA   Comparison: compared with previous ECG from 12/17/2018  Similar to previous ECG  Rhythm: sinus rhythm and paced  Rate: normal  Conduction: conduction normal  ST Segments: ST segments normal  QRS axis: normal  Other: no other findings    Clinical impression: normal ECG  Comments:             Device Interrogation:  Dual-chamber [employer] ICD.  DDDR 60/105.  Underlying rhythm sinus bradycardia in the 50s.  A paced 91%.  RV paced 23%.  P wave 5.0.  R wave 11.5.  Atrial threshold 0.75 V at 0.5 ms.  RV threshold 0.75 V at 0.5 ms.  Atrial impedance 460 ohms.  RV impedance 330 ohms.  Battery voltage 17%.  1.5 years remaining.  Charge time 10.0 seconds.  Brief SVT less than 10 seconds with 7 mode switch less than 1%.  No A. fib.  No VT or VF.    ASSESSMENT   1. SVTAtrial flutter:  EPS and CTI RFA. During the study she had episodes of Afib. Sotalol therpay started for recurrent SVT after Ablation that reslulted in ICD shocks.  No VT.  No further ICD shocks.  Rare occasional Palpations with less then 10 seconds of SVT.    2. HOCM: St. Jack ICD. Normal function.  Previous Echo with Dr Vuong's office  3. NCS: No further recurrent events.   4. Anticoagulation:  Asa daily.  Chadsvasc=1.     PLAN  · The patient will continue sotalol therapy for recurrent SVT following RFA of typical atrial flutter which she is started back in September 2018.  · She has had no recurrent episodes neurocardiac syncope she stays well-hydrated.  · Would like to take aspirin daily she is a history of Chadsvasc=1.  · Discussed with patient the option of considering repeat EP study possible ablation.  For now she  would like to continue medication but she may consider this in the future.  · Follow-up in 6 months or sooner as needed.    4/22/2019  10:20 AM    Will Taj SEPULVEDA

## 2019-06-06 ENCOUNTER — TELEPHONE (OUTPATIENT)
Dept: CARDIOLOGY | Facility: CLINIC | Age: 54
End: 2019-06-06

## 2019-06-06 NOTE — TELEPHONE ENCOUNTER
Monitor did not transmit on scheduled date. Left message for pt to send manual transmission and left my name and number for her to call for assistance.

## 2019-09-05 ENCOUNTER — CLINICAL SUPPORT NO REQUIREMENTS (OUTPATIENT)
Dept: CARDIOLOGY | Facility: CLINIC | Age: 54
End: 2019-09-05

## 2019-09-05 ENCOUNTER — TELEPHONE (OUTPATIENT)
Dept: CARDIOLOGY | Facility: CLINIC | Age: 54
End: 2019-09-05

## 2019-09-05 DIAGNOSIS — I47.1 SVT (SUPRAVENTRICULAR TACHYCARDIA) (HCC): ICD-10-CM

## 2019-09-05 DIAGNOSIS — I42.1 HYPERTROPHIC OBSTRUCTIVE CARDIOMYOPATHY (HCC): ICD-10-CM

## 2019-09-05 PROCEDURE — 93295 DEV INTERROG REMOTE 1/2/MLT: CPT | Performed by: INTERNAL MEDICINE

## 2019-09-05 PROCEDURE — 93296 REM INTERROG EVL PM/IDS: CPT | Performed by: INTERNAL MEDICINE

## 2019-09-05 NOTE — TELEPHONE ENCOUNTER
Called pt due to [facility] home monitor didn't send scheduled reading. Left message with my name and number.

## 2019-10-24 ENCOUNTER — OFFICE VISIT (OUTPATIENT)
Dept: CARDIOLOGY | Facility: CLINIC | Age: 54
End: 2019-10-24

## 2019-10-24 VITALS
DIASTOLIC BLOOD PRESSURE: 60 MMHG | OXYGEN SATURATION: 99 % | HEIGHT: 65 IN | SYSTOLIC BLOOD PRESSURE: 108 MMHG | HEART RATE: 60 BPM | WEIGHT: 155 LBS | BODY MASS INDEX: 25.83 KG/M2

## 2019-10-24 DIAGNOSIS — I42.1 HYPERTROPHIC OBSTRUCTIVE CARDIOMYOPATHY (HCC): Primary | ICD-10-CM

## 2019-10-24 DIAGNOSIS — I47.1 SVT (SUPRAVENTRICULAR TACHYCARDIA) (HCC): ICD-10-CM

## 2019-10-24 DIAGNOSIS — Z79.899 LONG TERM CURRENT USE OF ANTIARRHYTHMIC MEDICAL THERAPY: ICD-10-CM

## 2019-10-24 DIAGNOSIS — Z95.810 ICD (IMPLANTABLE CARDIOVERTER-DEFIBRILLATOR) IN PLACE: ICD-10-CM

## 2019-10-24 DIAGNOSIS — R55 NEUROCARDIOGENIC SYNCOPE: ICD-10-CM

## 2019-10-24 PROCEDURE — 99214 OFFICE O/P EST MOD 30 MIN: CPT | Performed by: INTERNAL MEDICINE

## 2019-10-24 PROCEDURE — 93283 PRGRMG EVAL IMPLANTABLE DFB: CPT | Performed by: INTERNAL MEDICINE

## 2019-10-24 NOTE — PROGRESS NOTES
Smiley Sibley  1965  PCP: Chloe Childers PA    SUBJECTIVE:   Smiley Sibley is a 54 y.o. female seen for a follow up visit regarding the following:     Chief Complaint: Follow up for HOCM, Tachycardia    HPI:    Since last visit the patient's status has been cardiac stable. No significant Tachycardia or SVT events.     History:   The patient is a pleasant 54-year-old female presents today for follow-up regarding history of SVT, remote atrial flutter ablation, neurocardiac syncope, St. Jack ICD and HOCM.  The patient had a EP study and RFA of typical atrial flutter by Dr. May September 2018.  However the patient developed recurrent SVT which resulted in an ICD shock.  In view of this finding the patient was placed on sotalol therapy.  She has been stable on the medication with no recurrent episodes of ICD shocks.  She has a chest pain or chest tightness suggesting his pectoris.  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  She denies any sudden dizziness, near syncope syncope or ICD shocks.  She reports that she does check in with Dr. Cardona's office on a regular basis and recently saw them.  She states that she does rarely have a palpitation but is very brief in nature lasting few seconds and resolves on its own.  She would like to consider coming off the sotalol if possible and has thought about this in the past.    Cardiac PMH: (Old records have been reviewed and summarized below)  1. HOCM              A)St. Jack ICD placement Dr. Brandon 2010              B)Echocardiogram 2016 HOCM, Normal EF Dr. Duncan.   2. SVT              A)ICD shock 2016 - ? Secondary to SVT              B)Verapamil started 2016 with rare breakthrough episodes of SVT              C/ S/p Typical Aflutter ablation 9/2018  3. Hx of NCS  4. Migraine headaches.     Current Outpatient Medications:   •  sotalol (BETAPACE) 80 MG tablet, Take 1 tablet by mouth Every 12 (Twelve) Hours., Disp: 60  tablet, Rfl: 5    Past Medical History, Past Surgical History, Family history, Social History, and Medications were all reviewed with the patient today and updated as necessary.       Patient Active Problem List   Diagnosis   • Nonsustained ventricular tachycardia (CMS/HCC)   • Neurocardiogenic syncope   • Hypertrophic obstructive cardiomyopathy (CMS/HCC)   • Anxiety and depression   • Migraine headache   • ICD (implantable cardioverter-defibrillator) in place   • SVT (supraventricular tachycardia) (CMS/HCC)   • Long term current use of antiarrhythmic medical therapy     No Known Allergies  Past Medical History:   Diagnosis Date   • Abnormal ECG 1998   • Anxiety and depression 10/21/2016   • Arrhythmia    • Congenital heart disease 1998   • Coronary artery disease    • Heart murmur 1965   • Hyperlipidemia 2018   • Hypertrophic obstructive cardiomyopathy (CMS/HCC) 10/21/2016   • Hypertrophic obstructive cardiomyopathy (CMS/HCC)    • Migraine headache 10/21/2016   • Mitral valve prolapse 1998   • Neurocardiogenic syncope 10/21/2016   • Nonsustained ventricular tachycardia (CMS/HCC) 10/21/2016   • Sleep apnea 2013   • Valvular disease 1998     Past Surgical History:   Procedure Laterality Date   • ABLATION OF DYSRHYTHMIC FOCUS  09/13/2018   • CARDIAC CATHETERIZATION  2010   • CARDIAC DEFIBRILLATOR PLACEMENT  07/29/2010   • CARDIAC ELECTROPHYSIOLOGY PROCEDURE N/A 9/13/2018    Procedure: Ablation SVT;  Surgeon: Maximino Nassar DO;  Location: [facility] INVASIVE LOCATION;  Service: Cardiovascular   • DILATATION AND CURETTAGE     • HYSTEROTOMY     • INSERT / REPLACE / REMOVE PACEMAKER  07/29/2010     Family History   Problem Relation Age of Onset   • Arrhythmia Brother    • Heart disease Brother    • Arrhythmia Mother    • Heart disease Mother      Social History     Tobacco Use   • Smoking status: Never Smoker   • Smokeless tobacco: Never Used   Substance Use Topics   • Alcohol use: Yes     Alcohol/week: 0.6 - 1.2 oz      "Types: 1 - 2 Glasses of wine per week     Comment: 3-4 times/year         PHYSICAL EXAM:    /60 (BP Location: Left arm, Patient Position: Sitting)   Pulse 60   Ht 165.1 cm (65\")   Wt 70.3 kg (155 lb)   SpO2 99%   BMI 25.79 kg/m²        Wt Readings from Last 5 Encounters:   10/24/19 70.3 kg (155 lb)   04/22/19 64 kg (141 lb)   12/17/18 64 kg (141 lb 3.2 oz)   09/13/18 68.3 kg (150 lb 9.2 oz)   09/06/18 67.6 kg (149 lb)       BP Readings from Last 5 Encounters:   10/24/19 108/60   04/22/19 104/68   12/17/18 112/72   09/14/18 107/70   09/06/18 110/70       General-Well Nourished, Well developed  Eyes - PERRLA  Neck- supple, No mass  CV- regular rate and rhythm, no MRG, No edema  Lung- clear bilaterally  Abd- soft, +BS  Musc/skel - Norm strength and range of motion  Skin- warm and dry  Neuro - Alert & Oriented x 3, appropriate mood.        Medical problems and test results were reviewed with the patient today.     No results found for this or any previous visit (from the past 672 hour(s)).      EKG: (EKG has been independently visualized by me and summarized below)      ECG 12 Lead  Date/Time: 10/24/2019 10:41 AM  Performed by: Sanya Stiles MD  Authorized by: Sanya Stiles MD   Comparison: compared with previous ECG   Similar to previous ECG  Rhythm: sinus rhythm  Rate: normal  BPM: 62  Conduction: 1st degree AV block  Other findings: non-specific ST-T wave changes    Clinical impression: abnormal EKG             ASSESSMENT and PLAN  1. SVTAtrial flutter:  EPS and CTI RFA. During the study she had episodes of Afib. Sotalol therpay started for recurrent SVT after Ablation that reslulted in ICD shocks.  Rare occasional Palpations with less then 10 seconds of SVT.    2. HOCM: St. Jack ICD. Normal function.  Previous Echo with Dr Vuong's office  3. Syncope: No further recurrent events.   4. Anticoagulation:  Asa daily.  Chadsvasc=1.   5. Sotalol use - Stable EKG findings  6. ICD - Stable function. "         Return in about 6 months (around 4/24/2020) for office visit and device check with St. Jack.        Sanya Stiles M.D., [email or web address], F.H.R.S.  Cardiology/Electrophysiology  10/24/2019  10:43 AM

## 2019-12-09 ENCOUNTER — CLINICAL SUPPORT NO REQUIREMENTS (OUTPATIENT)
Dept: CARDIOLOGY | Facility: CLINIC | Age: 54
End: 2019-12-09

## 2019-12-09 DIAGNOSIS — I42.1 HYPERTROPHIC OBSTRUCTIVE CARDIOMYOPATHY (HCC): Primary | ICD-10-CM

## 2019-12-09 DIAGNOSIS — I47.1 SVT (SUPRAVENTRICULAR TACHYCARDIA) (HCC): ICD-10-CM

## 2019-12-09 DIAGNOSIS — I47.29 NONSUSTAINED VENTRICULAR TACHYCARDIA (HCC): ICD-10-CM

## 2019-12-09 PROCEDURE — 93294 REM INTERROG EVL PM/LDLS PM: CPT | Performed by: INTERNAL MEDICINE

## 2019-12-09 PROCEDURE — 93296 REM INTERROG EVL PM/IDS: CPT | Performed by: INTERNAL MEDICINE

## 2020-05-28 ENCOUNTER — OFFICE VISIT (OUTPATIENT)
Dept: CARDIOLOGY | Facility: CLINIC | Age: 55
End: 2020-05-28

## 2020-05-28 VITALS
OXYGEN SATURATION: 98 % | HEART RATE: 60 BPM | WEIGHT: 152 LBS | HEIGHT: 65 IN | SYSTOLIC BLOOD PRESSURE: 116 MMHG | TEMPERATURE: 97.8 F | DIASTOLIC BLOOD PRESSURE: 72 MMHG | BODY MASS INDEX: 25.33 KG/M2

## 2020-05-28 DIAGNOSIS — Z79.899 LONG TERM CURRENT USE OF ANTIARRHYTHMIC MEDICAL THERAPY: ICD-10-CM

## 2020-05-28 DIAGNOSIS — Z95.810 ICD (IMPLANTABLE CARDIOVERTER-DEFIBRILLATOR) IN PLACE: ICD-10-CM

## 2020-05-28 DIAGNOSIS — I47.1 SVT (SUPRAVENTRICULAR TACHYCARDIA) (HCC): Primary | ICD-10-CM

## 2020-05-28 PROCEDURE — 93283 PRGRMG EVAL IMPLANTABLE DFB: CPT | Performed by: PHYSICIAN ASSISTANT

## 2020-05-28 PROCEDURE — 99214 OFFICE O/P EST MOD 30 MIN: CPT | Performed by: PHYSICIAN ASSISTANT

## 2020-05-28 NOTE — PROGRESS NOTES
Smiley Sibley  1965  PCP: Chloe Childers PA    SUBJECTIVE:   Smiley Sibley is a 54 y.o. female seen for a follow up visit regarding the following:     Chief Complaint: Follow up for HOCM, SVT     HPI:    Since last visit the patient's status has been stable, but she is noticing an increase in palpitations. They seem to be occurring on a more frequent basis and lasting a bit longer. No issues with cp, sob, edema.     History:   The patient is a pleasant 54-year-old female presents today for follow-up regarding history of SVT, remote atrial flutter ablation, neurocardiac syncope, St. Jack ICD and HOCM.  The patient had a EP study and RFA of typical atrial flutter by Dr. May September 2018.  However the patient developed recurrent SVT which resulted in an ICD shock.  In view of this finding the patient was placed on sotalol therapy.  She has been stable on the medication with no recurrent episodes of ICD shocks.  She has a chest pain or chest tightness suggesting his pectoris.  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  She denies any sudden dizziness, near syncope syncope or ICD shocks.  She reports that she does check in with Dr. Cardona's office on a regular basis and recently saw them.  She states that she does rarely have a palpitation but is very brief in nature lasting few seconds and resolves on its own.  She would like to consider coming off the sotalol if possible and has thought about this in the past.     Cardiac PMH: (Old records have been reviewed and summarized below)  1. HOCM              A)St. Jack ICD placement Dr. Brandon 2010              B)Echocardiogram 2016 HOCM, Normal EF Dr. Duncan.   2. SVT              A)ICD shock 2016 - ? Secondary to SVT              B)Verapamil started 2016 with rare breakthrough episodes of SVT              C/ S/p Typical Aflutter ablation 9/2018  3. Hx of NCS  4. Migraine headaches.      Current Outpatient  Medications:   •  sotalol (BETAPACE) 80 MG tablet, Take 1 tablet by mouth Every 12 (Twelve) Hours., Disp: 60 tablet, Rfl: 5    Past Medical History, Past Surgical History, Family history, Social History, and Medications were all reviewed with the patient today and updated as necessary.       Patient Active Problem List   Diagnosis   • Nonsustained ventricular tachycardia (CMS/HCC)   • Neurocardiogenic syncope   • Hypertrophic obstructive cardiomyopathy (CMS/HCC)   • Anxiety and depression   • Migraine headache   • ICD (implantable cardioverter-defibrillator) in place   • SVT (supraventricular tachycardia) (CMS/HCC)   • Long term current use of antiarrhythmic medical therapy     No Known Allergies  Past Medical History:   Diagnosis Date   • Abnormal ECG 1998   • Anxiety and depression 10/21/2016   • Arrhythmia    • Congenital heart disease 1998   • Coronary artery disease    • Heart murmur 1966   • Hyperlipidemia 2018   • Hypertrophic obstructive cardiomyopathy (CMS/HCC) 10/21/2016   • Hypertrophic obstructive cardiomyopathy (CMS/HCC)    • Migraine headache 10/21/2016   • Mitral valve prolapse 1998   • Neurocardiogenic syncope 10/21/2016   • Nonsustained ventricular tachycardia (CMS/HCC) 10/21/2016   • Sleep apnea 2013   • Valvular disease 1998     Past Surgical History:   Procedure Laterality Date   • ABLATION OF DYSRHYTHMIC FOCUS  09/13/2018   • CARDIAC CATHETERIZATION  2010   • CARDIAC DEFIBRILLATOR PLACEMENT  07/29/2010   • CARDIAC ELECTROPHYSIOLOGY PROCEDURE N/A 9/13/2018    Procedure: Ablation SVT;  Surgeon: Maximino Nassar DO;  Location: [facility] INVASIVE LOCATION;  Service: Cardiovascular   • DILATATION AND CURETTAGE     • HYSTEROTOMY     • INSERT / REPLACE / REMOVE PACEMAKER  07/29/2010     Family History   Problem Relation Age of Onset   • Arrhythmia Brother    • Heart disease Brother    • Arrhythmia Mother    • Heart disease Mother      Social History     Tobacco Use   • Smoking status: Never Smoker   •  "Smokeless tobacco: Never Used   Substance Use Topics   • Alcohol use: Yes     Alcohol/week: 1.0 - 2.0 standard drinks     Types: 1 - 2 Glasses of wine per week     Comment: 3-4 times/year         PHYSICAL EXAM:    /72 (BP Location: Left arm, Patient Position: Sitting)   Pulse 60   Temp 97.8 °F (36.6 °C)   Ht 165.1 cm (65\")   Wt 68.9 kg (152 lb)   SpO2 98%   BMI 25.29 kg/m²        Wt Readings from Last 5 Encounters:   05/28/20 68.9 kg (152 lb)   10/24/19 70.3 kg (155 lb)   04/22/19 64 kg (141 lb)   12/17/18 64 kg (141 lb 3.2 oz)   09/13/18 68.3 kg (150 lb 9.2 oz)       BP Readings from Last 5 Encounters:   05/28/20 116/72   10/24/19 108/60   04/22/19 104/68   12/17/18 112/72   09/14/18 107/70       General-Well Nourished, Well developed  Eyes - PERRLA  Neck- supple, No mass  CV- regular rate and rhythm, no MRG, No edema  Lung- clear bilaterally  Abd- soft, +BS  Musc/skel - Norm strength and range of motion  Skin- warm and dry  Neuro - Alert & Oriented x 3, appropriate mood.        Medical problems and test results were reviewed with the patient today.     No results found for this or any previous visit (from the past 672 hour(s)).      EKG: (EKG has been independently visualized by me and summarized below)      ECG 12 Lead  Date/Time: 5/28/2020 11:01 AM  Performed by: Nati Burnham PA  Authorized by: Nati Burnham PA   Comparison: compared with previous ECG from 10/24/2019  Similar to previous ECG  Rhythm: sinus rhythm  Rate: normal  BPM: 60  QRS axis: right    Clinical impression: abnormal EKG             ASSESSMENT and PLAN    1. SVTAtrial flutter:  EPS and CTI RFA. During the study she had episodes of Afib. Sotalol therpay started for recurrent SVT after Ablation that reslulted in ICD shocks. She is noticing some increase in palpitations and SVT episodes are lasting a bit longer. Can plan to admit for increase in Sotalol dose at time of generator change if she is continuing to have issues at that " time. She notes decrease in water intake and would like to fix this and see if symptoms improve.   2. HOCM: St. Jack ICD. Normal function.  Previous Echo with Dr Vuong's office  3. Syncope: No further recurrent events.   4. Anticoagulation:  Asa daily.  Chadsvasc=1.   5. Sotalol use - EKG reviewed. QTc is stable.   6. ICD - Stable function. 3 months to MARTA. Patient has home monitoring.       Return in about 3 months (around 8/28/2020) for SJM.        Nati Burnham PA-C   Cardiology/Electrophysiology  5/28/2020  11:30

## 2020-06-08 ENCOUNTER — TELEPHONE (OUTPATIENT)
Dept: CARDIOLOGY | Facility: CLINIC | Age: 55
End: 2020-06-08

## 2020-06-08 DIAGNOSIS — I42.1 HYPERTROPHIC OBSTRUCTIVE CARDIOMYOPATHY (HCC): Primary | ICD-10-CM

## 2020-06-08 NOTE — TELEPHONE ENCOUNTER
Called pt due to St Jack defibrillator is MARTA.  She is aware and going to get setup for generator change.

## 2020-07-16 ENCOUNTER — PREP FOR SURGERY (OUTPATIENT)
Dept: OTHER | Facility: HOSPITAL | Age: 55
End: 2020-07-16

## 2020-07-16 DIAGNOSIS — Z95.810 ICD (IMPLANTABLE CARDIOVERTER-DEFIBRILLATOR) IN PLACE: ICD-10-CM

## 2020-07-16 DIAGNOSIS — I47.29 NONSUSTAINED VENTRICULAR TACHYCARDIA (HCC): Primary | ICD-10-CM

## 2020-07-16 RX ORDER — SODIUM CHLORIDE 0.9 % (FLUSH) 0.9 %
3 SYRINGE (ML) INJECTION EVERY 12 HOURS SCHEDULED
Status: CANCELLED | OUTPATIENT
Start: 2020-07-16

## 2020-07-16 RX ORDER — CEFAZOLIN SODIUM 2 G/100ML
2 INJECTION, SOLUTION INTRAVENOUS ONCE
Status: CANCELLED | OUTPATIENT
Start: 2020-07-16 | End: 2020-07-16

## 2020-07-16 RX ORDER — SODIUM CHLORIDE 0.9 % (FLUSH) 0.9 %
10 SYRINGE (ML) INJECTION AS NEEDED
Status: CANCELLED | OUTPATIENT
Start: 2020-07-16

## 2020-07-17 ENCOUNTER — APPOINTMENT (OUTPATIENT)
Dept: PREADMISSION TESTING | Facility: HOSPITAL | Age: 55
End: 2020-07-17

## 2020-07-17 DIAGNOSIS — I47.29 NONSUSTAINED VENTRICULAR TACHYCARDIA (HCC): ICD-10-CM

## 2020-07-17 LAB
ALBUMIN SERPL-MCNC: 4.8 G/DL (ref 3.5–5.2)
ALBUMIN/GLOB SERPL: 1.9 G/DL
ALP SERPL-CCNC: 96 U/L (ref 39–117)
ALT SERPL W P-5'-P-CCNC: 40 U/L (ref 1–33)
ANION GAP SERPL CALCULATED.3IONS-SCNC: 10 MMOL/L (ref 5–15)
AST SERPL-CCNC: 37 U/L (ref 1–32)
BASOPHILS # BLD AUTO: 0.03 10*3/MM3 (ref 0–0.2)
BASOPHILS NFR BLD AUTO: 0.3 % (ref 0–1.5)
BILIRUB SERPL-MCNC: 0.5 MG/DL (ref 0–1.2)
BUN SERPL-MCNC: 18 MG/DL (ref 6–20)
BUN/CREAT SERPL: 17.3 (ref 7–25)
CALCIUM SPEC-SCNC: 10.3 MG/DL (ref 8.6–10.5)
CHLORIDE SERPL-SCNC: 102 MMOL/L (ref 98–107)
CO2 SERPL-SCNC: 28 MMOL/L (ref 22–29)
CREAT SERPL-MCNC: 1.04 MG/DL (ref 0.57–1)
DEPRECATED RDW RBC AUTO: 44 FL (ref 37–54)
EOSINOPHIL # BLD AUTO: 0.13 10*3/MM3 (ref 0–0.4)
EOSINOPHIL NFR BLD AUTO: 1.3 % (ref 0.3–6.2)
ERYTHROCYTE [DISTWIDTH] IN BLOOD BY AUTOMATED COUNT: 12.6 % (ref 12.3–15.4)
GFR SERPL CREATININE-BSD FRML MDRD: 55 ML/MIN/1.73
GLOBULIN UR ELPH-MCNC: 2.5 GM/DL
GLUCOSE SERPL-MCNC: 106 MG/DL (ref 65–99)
HCT VFR BLD AUTO: 45 % (ref 34–46.6)
HGB BLD-MCNC: 14.5 G/DL (ref 12–15.9)
IMM GRANULOCYTES # BLD AUTO: 0.02 10*3/MM3 (ref 0–0.05)
IMM GRANULOCYTES NFR BLD AUTO: 0.2 % (ref 0–0.5)
LYMPHOCYTES # BLD AUTO: 1.64 10*3/MM3 (ref 0.7–3.1)
LYMPHOCYTES NFR BLD AUTO: 16 % (ref 19.6–45.3)
MAGNESIUM SERPL-MCNC: 2.2 MG/DL (ref 1.6–2.6)
MCH RBC QN AUTO: 30.9 PG (ref 26.6–33)
MCHC RBC AUTO-ENTMCNC: 32.2 G/DL (ref 31.5–35.7)
MCV RBC AUTO: 95.9 FL (ref 79–97)
MONOCYTES # BLD AUTO: 0.93 10*3/MM3 (ref 0.1–0.9)
MONOCYTES NFR BLD AUTO: 9.1 % (ref 5–12)
NEUTROPHILS NFR BLD AUTO: 7.51 10*3/MM3 (ref 1.7–7)
NEUTROPHILS NFR BLD AUTO: 73.1 % (ref 42.7–76)
NRBC BLD AUTO-RTO: 0 /100 WBC (ref 0–0.2)
PLATELET # BLD AUTO: 327 10*3/MM3 (ref 140–450)
PMV BLD AUTO: 10.4 FL (ref 6–12)
POTASSIUM SERPL-SCNC: 4.6 MMOL/L (ref 3.5–5.2)
PROT SERPL-MCNC: 7.3 G/DL (ref 6–8.5)
RBC # BLD AUTO: 4.69 10*6/MM3 (ref 3.77–5.28)
SODIUM SERPL-SCNC: 140 MMOL/L (ref 136–145)
WBC # BLD AUTO: 10.26 10*3/MM3 (ref 3.4–10.8)

## 2020-07-17 PROCEDURE — 85025 COMPLETE CBC W/AUTO DIFF WBC: CPT | Performed by: PHYSICIAN ASSISTANT

## 2020-07-17 PROCEDURE — U0002 COVID-19 LAB TEST NON-CDC: HCPCS

## 2020-07-17 PROCEDURE — U0004 COV-19 TEST NON-CDC HGH THRU: HCPCS

## 2020-07-17 PROCEDURE — C9803 HOPD COVID-19 SPEC COLLECT: HCPCS

## 2020-07-17 PROCEDURE — 36415 COLL VENOUS BLD VENIPUNCTURE: CPT

## 2020-07-17 PROCEDURE — 80053 COMPREHEN METABOLIC PANEL: CPT | Performed by: PHYSICIAN ASSISTANT

## 2020-07-17 PROCEDURE — 83735 ASSAY OF MAGNESIUM: CPT | Performed by: PHYSICIAN ASSISTANT

## 2020-07-17 NOTE — DISCHARGE INSTRUCTIONS

## 2020-07-18 LAB
REF LAB TEST METHOD: NORMAL
SARS-COV-2 RNA RESP QL NAA+PROBE: NOT DETECTED

## 2020-07-20 ENCOUNTER — HOSPITAL ENCOUNTER (OUTPATIENT)
Facility: HOSPITAL | Age: 55
Setting detail: HOSPITAL OUTPATIENT SURGERY
Discharge: HOME | End: 2020-07-20
Attending: INTERNAL MEDICINE | Admitting: INTERNAL MEDICINE

## 2020-07-20 VITALS
SYSTOLIC BLOOD PRESSURE: 114 MMHG | BODY MASS INDEX: 25.64 KG/M2 | RESPIRATION RATE: 11 BRPM | DIASTOLIC BLOOD PRESSURE: 77 MMHG | TEMPERATURE: 96.6 F | HEART RATE: 60 BPM | WEIGHT: 153.88 LBS | OXYGEN SATURATION: 97 % | HEIGHT: 65 IN

## 2020-07-20 DIAGNOSIS — I47.29 NONSUSTAINED VENTRICULAR TACHYCARDIA (HCC): ICD-10-CM

## 2020-07-20 DIAGNOSIS — I42.1 HYPERTROPHIC OBSTRUCTIVE CARDIOMYOPATHY (HCC): ICD-10-CM

## 2020-07-20 DIAGNOSIS — Z95.810 ICD (IMPLANTABLE CARDIOVERTER-DEFIBRILLATOR) IN PLACE: ICD-10-CM

## 2020-07-20 LAB — B-HCG UR QL: NEGATIVE

## 2020-07-20 PROCEDURE — 99153 MOD SED SAME PHYS/QHP EA: CPT | Performed by: INTERNAL MEDICINE

## 2020-07-20 PROCEDURE — 99152 MOD SED SAME PHYS/QHP 5/>YRS: CPT | Performed by: INTERNAL MEDICINE

## 2020-07-20 PROCEDURE — S0260 H&P FOR SURGERY: HCPCS | Performed by: INTERNAL MEDICINE

## 2020-07-20 PROCEDURE — C1721 AICD, DUAL CHAMBER: HCPCS | Performed by: INTERNAL MEDICINE

## 2020-07-20 PROCEDURE — 33263 RMVL & RPLCMT DFB GEN 2 LEAD: CPT | Performed by: INTERNAL MEDICINE

## 2020-07-20 PROCEDURE — 25010000002 MIDAZOLAM PER 1 MG: Performed by: INTERNAL MEDICINE

## 2020-07-20 PROCEDURE — 93641 EP EVL 1/2CHMB PAC CVDFB TST: CPT | Performed by: INTERNAL MEDICINE

## 2020-07-20 PROCEDURE — 25010000003 CEFAZOLIN IN DEXTROSE 2-4 GM/100ML-% SOLUTION: Performed by: PHYSICIAN ASSISTANT

## 2020-07-20 PROCEDURE — 25010000002 FENTANYL CITRATE (PF) 100 MCG/2ML SOLUTION: Performed by: INTERNAL MEDICINE

## 2020-07-20 PROCEDURE — 81025 URINE PREGNANCY TEST: CPT | Performed by: INTERNAL MEDICINE

## 2020-07-20 DEVICE — ICD FORTIFY ASSURA NEXTGEN DR DF4: Type: IMPLANTABLE DEVICE | Status: FUNCTIONAL

## 2020-07-20 RX ORDER — CEFAZOLIN SODIUM 2 G/100ML
2 INJECTION, SOLUTION INTRAVENOUS ONCE
Status: COMPLETED | OUTPATIENT
Start: 2020-07-20 | End: 2020-07-20

## 2020-07-20 RX ORDER — BUPIVACAINE HYDROCHLORIDE 5 MG/ML
INJECTION, SOLUTION EPIDURAL; INTRACAUDAL AS NEEDED
Status: DISCONTINUED | OUTPATIENT
Start: 2020-07-20 | End: 2020-07-20 | Stop reason: HOSPADM

## 2020-07-20 RX ORDER — SODIUM CHLORIDE 0.9 % (FLUSH) 0.9 %
10 SYRINGE (ML) INJECTION AS NEEDED
Status: DISCONTINUED | OUTPATIENT
Start: 2020-07-20 | End: 2020-07-20 | Stop reason: HOSPADM

## 2020-07-20 RX ORDER — SODIUM CHLORIDE 0.9 % (FLUSH) 0.9 %
3 SYRINGE (ML) INJECTION EVERY 12 HOURS SCHEDULED
Status: DISCONTINUED | OUTPATIENT
Start: 2020-07-20 | End: 2020-07-20 | Stop reason: HOSPADM

## 2020-07-20 RX ORDER — OXYCODONE HYDROCHLORIDE AND ACETAMINOPHEN 5; 325 MG/1; MG/1
1 TABLET ORAL EVERY 6 HOURS PRN
Qty: 5 TABLET | Refills: 0 | Status: SHIPPED | OUTPATIENT
Start: 2020-07-20 | End: 2020-10-22

## 2020-07-20 RX ORDER — FENTANYL CITRATE 50 UG/ML
INJECTION, SOLUTION INTRAMUSCULAR; INTRAVENOUS AS NEEDED
Status: DISCONTINUED | OUTPATIENT
Start: 2020-07-20 | End: 2020-07-20 | Stop reason: HOSPADM

## 2020-07-20 RX ORDER — MIDAZOLAM HYDROCHLORIDE 1 MG/ML
INJECTION INTRAMUSCULAR; INTRAVENOUS AS NEEDED
Status: DISCONTINUED | OUTPATIENT
Start: 2020-07-20 | End: 2020-07-20 | Stop reason: HOSPADM

## 2020-07-20 RX ORDER — SODIUM CHLORIDE 9 MG/ML
INJECTION, SOLUTION INTRAVENOUS CONTINUOUS PRN
Status: COMPLETED | OUTPATIENT
Start: 2020-07-20 | End: 2020-07-20

## 2020-07-20 RX ORDER — CEPHALEXIN 500 MG/1
500 CAPSULE ORAL 3 TIMES DAILY
Qty: 9 CAPSULE | Refills: 0 | Status: SHIPPED | OUTPATIENT
Start: 2020-07-20 | End: 2020-07-23

## 2020-07-20 RX ORDER — LIDOCAINE HYDROCHLORIDE AND EPINEPHRINE 10; 10 MG/ML; UG/ML
INJECTION, SOLUTION INFILTRATION; PERINEURAL AS NEEDED
Status: DISCONTINUED | OUTPATIENT
Start: 2020-07-20 | End: 2020-07-20 | Stop reason: HOSPADM

## 2020-07-20 RX ADMIN — CEFAZOLIN SODIUM 2 G: 2 INJECTION, SOLUTION INTRAVENOUS at 13:32

## 2020-07-20 NOTE — H&P
[facility]  CARDIOLOGY HISTORY AND PHYSICAL    Smiley Sibley  : 1965  MRN:[id number]    Date of Admission:2020      PCP: Chloe Childers PA    IDENTIFICATION: A 54 y.o. female     Cc: HOCM    PROBLEM LIST:   1. HOCM              A)St. Jack dual-chamber ICD placement Dr. Brandon               B)Echocardiogram  HOC, Normal EF Dr. Duncan.   2. SVT              A)ICD shock 2016 - ? Secondary to SVT              B)Verapamil started  with rare breakthrough episodes of SVT              C/ S/p Typical Aflutter ablation 2018  3. Hx of NCS  4. Migraine headaches.    ALLERGIES: No Known Allergies    HOME MEDICINES:   Outpatient Medications    sotalol (BETAPACE) 80 MG tablet        HPI:   54-year-old female presents to [facility] for [facility] Jack dual-chamber ICD generator change.  The patient had a device placed for primary prevention for hypertrophic obstructive cardiomyopathy.  She was followed for a while by Dr. Dakota Harltey cardiology.  In addition she has had an SVT and underwent an EP study and successful RFA of typical atrial flutter by Dr. Nassar in 2018.  During the procedure she developed recurrent SVT and eventually was placed on sotalol therapy maintain normal sinus rhythm.  In the last office visit her device interrogated revealing is reached MARTA.  She presents today for generator change.  She denies any chest pain, heart failure symptoms or recent ICD shocks.  She states she is had minimal palpitations on sotalol therapy.    ROS: All systems have been reviewed and are negative with the exception of those mentioned in the HPI and problem list above.    Surgical History:   Past Surgical History:   Procedure Laterality Date   • ABLATION OF DYSRHYTHMIC FOCUS  2018   • CARDIAC CATHETERIZATION     • CARDIAC DEFIBRILLATOR PLACEMENT  2010   • CARDIAC ELECTROPHYSIOLOGY PROCEDURE N/A 2018    Procedure: Ablation SVT;   Surgeon: Maximino Nassar DO;  Location: [facility] INVASIVE LOCATION;  Service: Cardiovascular   • COLONOSCOPY     • DILATATION AND CURETTAGE     • HYSTEROTOMY     • INSERT / REPLACE / REMOVE PACEMAKER  07/29/2010       Social History:   Social History     Socioeconomic History   • Marital status:      Spouse name: Not on file   • Number of children: Not on file   • Years of education: Not on file   • Highest education level: Not on file   Tobacco Use   • Smoking status: Never Smoker   • Smokeless tobacco: Never Used   Substance and Sexual Activity   • Alcohol use: Yes     Alcohol/week: 1.0 - 2.0 standard drinks     Types: 1 - 2 Glasses of wine per week     Comment: 3-4 times/year   • Drug use: No   • Sexual activity: Yes     Partners: Male     Birth control/protection: Surgical       Family History:   Family History   Problem Relation Age of Onset   • Arrhythmia Brother    • Heart disease Brother    • Arrhythmia Mother    • Heart disease Mother        Objective     /79   Pulse 60   Temp 96.6 °F (35.9 °C)   LMP  (LMP Unknown)   SpO2 100%   No intake or output data in the 24 hours ending 07/20/20 1033    PHYSICAL EXAM:  Constitutional:  Well-nourished, cooperative, in no acute distress.   Head:  Normocephalic, without obvious abnormality, atraumatic.   Neck: No adenopathy, supple, trachea midline, no thyromegaly, no    carotid bruit, no JVD.   Respiratory:   Clear to auscultation bilaterally; respirations regular, even and unlabored. No wheezes, rales or rhonchi.    Cardiovascular:  Regular rhythm and normal rate, normal S1 and S2, no            murmur, no gallop, no rub, no click.   Pulses: Peripheral pulses are present and equal bilaterally.   GI:   Soft, non-distended. Bowel sounds heard throughout. No organomegaly or masses. Non-tender to palpation, no guarding.   Extremities: No edema, clubbing or cyanosis.   Skin: Skin is warm and dry. No bleeding, bruising or rash.   Neurological: Alert,  oriented to time, person and place. No focal deficits.     Labs/Diagnostic Data  Results from last 7 days   Lab Units 07/17/20  1415   SODIUM mmol/L 140   POTASSIUM mmol/L 4.6   CHLORIDE mmol/L 102   CO2 mmol/L 28.0   BUN mg/dL 18   CREATININE mg/dL 1.04*   GLUCOSE mg/dL 106*   CALCIUM mg/dL 10.3         Results from last 7 days   Lab Units 07/17/20  1415   WBC 10*3/mm3 10.26   HEMOGLOBIN g/dL 14.5   HEMATOCRIT % 45.0   PLATELETS 10*3/mm3 327     Results from last 7 days   Lab Units 07/17/20  1415   MAGNESIUM mg/dL 2.2                           I personally reviewed the patient's EKG/Telemetry data  NSR      Current Medications:      ceFAZolin 2 g Intravenous Once   sodium chloride 3 mL Intravenous Q12H          Assessment and Plan:     1. St. Jack ICD MARTA  2. SVT: Remote CTI RFA 9/18. Recurrent SVT-Sotalol Dr. Stiles  3. NCS: No recurrent events.         PLAN: ICD Generator change. We discussed the procedure in great detail including risks, benefits, and alternatives. The patient understands that risks include bleeding, infection, stroke, MI, cardiac perforation, and even death.    Electronically signed by GREGORY Us, 07/20/20, 10:37 AM.

## 2020-07-20 NOTE — PROCEDURES
PRE-ELECTROPHYSIOLOGY STUDY DIAGNOSES  1. Implantable cardioverter-defibrillator at elective replacement interval.  2. Hypertrophic obstructive cardiomyopathy.  3. Ejection fraction 55%.  4. Initial implantable cardioverter-defibrillator was implanted for primary prevention.  5. Patient has indications for permanent pacing secondary to symptomatic sick sinus syndrome.    PROCEDURES PERFORMED  1. Removal and replacement of a Dual implantable cardioverter-defibrillator.  2. Testing of implantable cardioverter-defibrillator.  3. Sedation     Anesthesia: Cath lab moderate sedation    I was present with the patient for the duration of moderate sedation and supervised staff who had no other duties and monitored the patient for the entire procedure     Name of independent trained observer: Jessica Velazquez RN  Intra-Service start time: 1333  Intra-Service end time: 1411     Estimated Blood Loss: Less than 10 mL     Specimens: None     PROCEDURE IN DETAIL: The patient was brought into the EP lab in a fasting  state. The left shoulder was prepped and draped in the usual sterile  fashion, skin anesthetized with lidocaine with epinephrine. Incision was  made over the previous ICD. Previous  ICD was  removed through blunt dissection techniques. Pocket was irrigated with  triple-antibiotic flush. The leads were connected to a new St. Jack   ICD, model [id number], serial number [id number]. The  leads and ICD were then placed in the pocket area.  Defibrillator threshold testing was performed. The patient was placed  into ventricular fibrillation and was converted out with 25 joules'  energy.  Pocket was then closed with 2-0 Vicryl, followed by a next layer of 3-0  Vicryl, followed by a superficial layer of staples. The wound was  dressed. The patient was recovered from their sedation, transferred from  the lab in a stable condition.    IMPRESSION: Successful generator change of a St. Jack   implantable cardioverter-defibrillator with  successful defibrillator  threshold testing.

## 2020-08-03 ENCOUNTER — OFFICE VISIT (OUTPATIENT)
Dept: CARDIOLOGY | Facility: CLINIC | Age: 55
End: 2020-08-03

## 2020-08-03 DIAGNOSIS — I42.1 HYPERTROPHIC OBSTRUCTIVE CARDIOMYOPATHY (HCC): Primary | ICD-10-CM

## 2020-08-03 PROCEDURE — 93282 PRGRMG EVAL IMPLANTABLE DFB: CPT | Performed by: INTERNAL MEDICINE

## 2020-08-03 PROCEDURE — 99024 POSTOP FOLLOW-UP VISIT: CPT | Performed by: INTERNAL MEDICINE

## 2020-08-03 NOTE — PROGRESS NOTES
2020    Smiley Sibley, : 1965    WOUND CHECK      Patient has fever: [] YES   [x] NO     Temperature if indicated:       Wound Location:  Left shoulder    Dressing was:  Removed       Old Dressing Appearance:  Old, bloody drainage        Wound Appearance:  Incision well-approximated with no signs or symptoms of infection        Gloves used, staples removed without diffuculty, wound cleansed with alcohol       Incision dresssed with triple antibiotic ointment, 4x4, and tegaderm with patient to remove in 3 days.  Verbal understanding from patient       Device was: Interrogated - Please see separate report        Plan:  Normal wound check      Appointment for follow-up scheduled for 3 months post procedure [x]    Future Appointments   Date Time Provider Department Center   10/22/2020  9:30 AM Sanya Stiles MD [facility] None           Awilda Sy RN, 20

## 2020-09-08 ENCOUNTER — TELEPHONE (OUTPATIENT)
Dept: CARDIOLOGY | Facility: CLINIC | Age: 55
End: 2020-09-08

## 2020-09-08 NOTE — TELEPHONE ENCOUNTER
Called pt due to Merlin home monitor hasn't communicated with her new device.  Left message for her to return my call.

## 2020-10-22 ENCOUNTER — OFFICE VISIT (OUTPATIENT)
Dept: CARDIOLOGY | Facility: CLINIC | Age: 55
End: 2020-10-22

## 2020-10-22 VITALS
SYSTOLIC BLOOD PRESSURE: 108 MMHG | HEIGHT: 65 IN | WEIGHT: 153.4 LBS | TEMPERATURE: 97.7 F | DIASTOLIC BLOOD PRESSURE: 66 MMHG | HEART RATE: 60 BPM | BODY MASS INDEX: 25.56 KG/M2

## 2020-10-22 DIAGNOSIS — I42.1 HYPERTROPHIC OBSTRUCTIVE CARDIOMYOPATHY (HCC): ICD-10-CM

## 2020-10-22 DIAGNOSIS — Z79.899 LONG TERM CURRENT USE OF ANTIARRHYTHMIC MEDICAL THERAPY: ICD-10-CM

## 2020-10-22 DIAGNOSIS — I47.1 SVT (SUPRAVENTRICULAR TACHYCARDIA) (HCC): ICD-10-CM

## 2020-10-22 DIAGNOSIS — Z95.810 ICD (IMPLANTABLE CARDIOVERTER-DEFIBRILLATOR) IN PLACE: Primary | ICD-10-CM

## 2020-10-22 PROCEDURE — 93283 PRGRMG EVAL IMPLANTABLE DFB: CPT | Performed by: PHYSICIAN ASSISTANT

## 2020-10-22 PROCEDURE — 99214 OFFICE O/P EST MOD 30 MIN: CPT | Performed by: PHYSICIAN ASSISTANT

## 2020-10-22 NOTE — PROGRESS NOTES
Smiley Sibley  1965  PCP: Chloe Childers PA    SUBJECTIVE:   Smiley Sibley is a 55 y.o. female seen for a follow up visit regarding the following:     Chief Complaint: Follow up for HOCM, SVT, ICD check     HPI:    Since last visit the patient's status has been stable. She underwent device generator change in July and has healed with no issues. She notes occasional palps that are brief, but is not terribly bothered by this. She does feel she does not have the same stamina she used to when exerting herself. No chest pain, sob, edema.     History:   The patient is a pleasant 54-year-old female presents today for follow-up regarding history of SVT, remote atrial flutter ablation, neurocardiac syncope, St. Jack ICD and HOCM.  The patient had a EP study and RFA of typical atrial flutter by Dr. May September 2018.  However the patient developed recurrent SVT which resulted in an ICD shock.  In view of this finding the patient was placed on sotalol therapy.  She has been stable on the medication with no recurrent episodes of ICD shocks.  She has a chest pain or chest tightness suggesting his pectoris.  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  She denies any sudden dizziness, near syncope syncope or ICD shocks.  She reports that she does check in with Dr. Cardona's office on a regular basis and recently saw them.  She states that she does rarely have a palpitation but is very brief in nature lasting few seconds and resolves on its own.  She would like to consider coming off the sotalol if possible and has thought about this in the past.     Cardiac PMH: (Old records have been reviewed and summarized below)  1. HOCM              A)[employer] Jack ICD placement Dr. Brandon 2010              B)Echocardiogram 2016 HOCM, Normal EF Dr. Duncan.   2. SVT              A)ICD shock 2016 - ? Secondary to SVT              B)Verapamil started 2016 with rare breakthrough episodes of  SVT              C/ S/p Typical Aflutter ablation 9/2018  3. Hx of NCS  4. Migraine headaches.      Current Outpatient Medications:   •  sotalol (BETAPACE) 80 MG tablet, Take 1 tablet by mouth Every 12 (Twelve) Hours., Disp: 60 tablet, Rfl: 5    Past Medical History, Past Surgical History, Family history, Social History, and Medications were all reviewed with the patient today and updated as necessary.       Patient Active Problem List   Diagnosis   • Nonsustained ventricular tachycardia (CMS/HCC)   • Neurocardiogenic syncope   • Hypertrophic obstructive cardiomyopathy (CMS/HCC)   • Anxiety and depression   • Migraine headache   • ICD (implantable cardioverter-defibrillator) in place   • SVT (supraventricular tachycardia) (CMS/HCC)   • Long term current use of antiarrhythmic medical therapy     No Known Allergies  Past Medical History:   Diagnosis Date   • Abnormal ECG 1998   • Anxiety and depression 10/21/2016   • Arrhythmia    • Congenital heart disease 1998   • Coronary artery disease    • Heart murmur 1966   • Hyperlipidemia 2018   • Hypertrophic obstructive cardiomyopathy (CMS/HCC) 10/21/2016   • Hypertrophic obstructive cardiomyopathy (CMS/HCC)    • Migraine headache 10/21/2016   • Mitral valve prolapse 1998   • Neurocardiogenic syncope 10/21/2016   • Nonsustained ventricular tachycardia (CMS/HCC) 10/21/2016   • Valvular disease 1998     Past Surgical History:   Procedure Laterality Date   • ABLATION OF DYSRHYTHMIC FOCUS  09/13/2018   • CARDIAC CATHETERIZATION  2010   • CARDIAC DEFIBRILLATOR PLACEMENT  07/29/2010   • CARDIAC ELECTROPHYSIOLOGY PROCEDURE N/A 9/13/2018    Procedure: Ablation SVT;  Surgeon: Maximino Nassar DO;  Location:  [facility];  Service: Cardiovascular   • CARDIAC ELECTROPHYSIOLOGY PROCEDURE N/A 7/20/2020    Procedure: ICD DC generator change-  3-4 weeks [facility];  Surgeon: Sanya Stiles MD;  Location:  [facility];  Service: Cardiology;  Laterality: N/A;   •  "COLONOSCOPY     • DILATATION AND CURETTAGE     • HYSTEROTOMY     • INSERT / REPLACE / REMOVE PACEMAKER  07/29/2010     Family History   Problem Relation Age of Onset   • Arrhythmia Brother    • Heart disease Brother    • Arrhythmia Mother    • Heart disease Mother      Social History     Tobacco Use   • Smoking status: Never Smoker   • Smokeless tobacco: Never Used   Substance Use Topics   • Alcohol use: Yes     Alcohol/week: 1.0 - 2.0 standard drinks     Types: 1 - 2 Glasses of wine per week     Comment: 1-2 times/year         PHYSICAL EXAM:    /66 (BP Location: Left arm, Patient Position: Sitting)   Pulse 60   Temp 97.7 °F (36.5 °C)   Ht 165.1 cm (65\")   Wt 69.6 kg (153 lb 6.4 oz)   LMP  (LMP Unknown)   BMI 25.53 kg/m²        Wt Readings from Last 5 Encounters:   10/22/20 69.6 kg (153 lb 6.4 oz)   07/20/20 69.8 kg (153 lb 14.1 oz)   05/28/20 68.9 kg (152 lb)   10/24/19 70.3 kg (155 lb)   04/22/19 64 kg (141 lb)       BP Readings from Last 5 Encounters:   10/22/20 108/66   07/20/20 114/77   05/28/20 116/72   10/24/19 108/60   04/22/19 104/68       General-Well Nourished, Well developed  Eyes - PERRLA  Neck- supple, No mass  CV- regular rate and rhythm, no MRG, No edema  Lung- clear bilaterally  Abd- soft, +BS  Musc/skel - Norm strength and range of motion  Skin- warm and dry  Neuro - Alert & Oriented x 3, appropriate mood.        Medical problems and test results were reviewed with the patient today.     No results found for this or any previous visit (from the past 672 hour(s)).      EKG: (EKG has been independently visualized by me and summarized below)      ECG 12 Lead    Date/Time: 10/22/2020 9:39 AM  Performed by: Nati Burnham PA  Authorized by: Nati Burnham PA   Comparison: compared with previous ECG from 5/28/2020  Similar to previous ECG  Rhythm: sinus rhythm and paced  Rate: normal  BPM: 60  Conduction: incomplete right bundle branch block    Clinical impression: abnormal EKG  Comments: " QTc stable              ASSESSMENT and PLAN    1. SVT/Atrial flutter:  EPS and CTI RFA. During the study she had episodes of Afib. Sotalol therpay started for recurrent SVT after Ablation that reslulted in ICD shocks. No afib on device interrogation. Started on Sotalol 80mg BID by Dr. Nassar for suppression. CHADS-VASC=1 for female gender- continue ASA. Monitor afib burden- if increases consider NOAC but for now benefit does not outweigh risk.   2. HOCM: [employer]. Jack ICD. Normal function.  Previous Echo with Dr Vuong's office. Normal EF  3. Syncope: No further recurrent events.   4. Anticoagulation:  Asa daily.  Chadsvasc=1.   5. Sotalol use - EKG reviewed. QTc is stable.    6. ICD - Stable function. Recent generator change 7/2020. <1% AMS w/ longest 46sec. Will increase sensor slope today.       Return in about 6 months (around 4/22/2021) for SJM.        Nati Burnham PA-C   Cardiology/Electrophysiology  10/22/2020  10:05 EDT

## 2020-12-08 ENCOUNTER — TELEPHONE (OUTPATIENT)
Dept: CARDIOLOGY | Facility: CLINIC | Age: 55
End: 2020-12-08

## 2020-12-08 NOTE — TELEPHONE ENCOUNTER
Called Mrs Sibley due to Merlin home monitor didn't send in scheduled reading.  Monitor is connecting.  Left message for her to return my call.

## 2021-03-10 PROCEDURE — 93295 DEV INTERROG REMOTE 1/2/MLT: CPT | Performed by: INTERNAL MEDICINE

## 2021-03-10 PROCEDURE — 93296 REM INTERROG EVL PM/IDS: CPT | Performed by: INTERNAL MEDICINE

## 2021-05-27 ENCOUNTER — OFFICE VISIT (OUTPATIENT)
Dept: CARDIOLOGY | Facility: CLINIC | Age: 56
End: 2021-05-27

## 2021-05-27 VITALS
HEART RATE: 86 BPM | SYSTOLIC BLOOD PRESSURE: 110 MMHG | WEIGHT: 157.4 LBS | HEIGHT: 65 IN | DIASTOLIC BLOOD PRESSURE: 76 MMHG | OXYGEN SATURATION: 98 % | BODY MASS INDEX: 26.23 KG/M2

## 2021-05-27 DIAGNOSIS — I42.1 HYPERTROPHIC OBSTRUCTIVE CARDIOMYOPATHY (HCC): Primary | ICD-10-CM

## 2021-05-27 DIAGNOSIS — R55 NEUROCARDIOGENIC SYNCOPE: ICD-10-CM

## 2021-05-27 DIAGNOSIS — I47.1 SVT (SUPRAVENTRICULAR TACHYCARDIA) (HCC): ICD-10-CM

## 2021-05-27 PROCEDURE — 99214 OFFICE O/P EST MOD 30 MIN: CPT | Performed by: PHYSICIAN ASSISTANT

## 2021-05-27 PROCEDURE — 93283 PRGRMG EVAL IMPLANTABLE DFB: CPT | Performed by: PHYSICIAN ASSISTANT

## 2021-05-27 NOTE — PROGRESS NOTES
[facility] - Office Note  Smiley Sibley         [address] [facility] [address]  1965   [phone] (home)        Location:  [facility].  Visit Type: Follow Up.    05/27/21     PCP:  Chloe Childers PA    Identification:  Smiley Sibley is a 55 y.o.  female  currently employed.      Chief Complaint: FU on HOCM with ICD interrogation, SVT    Cardiac PMH: (Old records have been reviewed and summarized below)  1. HOCM              A)[facility]. Jack ICD placement Dr. Brandon 2010              B)Echocardiogram 2016 HOCM, Normal EF Dr. Duncan.   2. SVT              A)ICD shock 2016 - ? Secondary to SVT              B)Verapamil started 2016 with rare breakthrough episodes of SVT              C/ S/p Typical Aflutter ablation 9/2018  3. Hx of NCS  4. Migraine headaches.      No Known Allergies      Current Outpatient Medications:   •  sotalol (BETAPACE) 80 MG tablet, Take 1 tablet by mouth Every 12 (Twelve) Hours., Disp: 60 tablet, Rfl: 5    HPI  Smiley Sibley is here today for a routine 6 month follow up on her history of HOCM with St. Jack ICD, SVT with history of ablation.  She is doing well overall from a cardiac standpoint.  She has no complaints of chest pain or exertional angina.  She's had no pre syncope or syncopal episodes.  Since we last saw her, she states she has experienced some pedal edema - occurred early May and shortness of breath with what she thinks is minimal exertion.  She states she's had these symptoms for some time - off and on for over a year.  Her edema occurred around May 9th after driving in a car to [place]. She admits she also ate a lot of fast food and meals that they normally don't eat.  She also admits to minimal H2O intake and inactivity.  She denies orthopnea or PND, denies audible wheezing.  She has not experienced any edema since returning home.          The following portions of the patient's history were reviewed in the  "chart and updated as appropriate: allergies, current medications, past family history, past medical history, past social history, past surgical history and problem list.    Review of Systems   Constitutional: Negative for malaise/fatigue and weight gain.   Cardiovascular: Positive for dyspnea on exertion, leg swelling and palpitations. Negative for chest pain, near-syncope, orthopnea, paroxysmal nocturnal dyspnea and syncope.   Respiratory: Negative for cough, shortness of breath, sleep disturbances due to breathing and wheezing.    Hematologic/Lymphatic: Negative for bleeding problem. Does not bruise/bleed easily.   Musculoskeletal: Negative for falls.   Neurological: Negative for dizziness and headaches.             height is 165.1 cm (65\") and weight is 71.4 kg (157 lb 6.4 oz). Her blood pressure is 110/76 and her pulse is 86. Her oxygen saturation is 98%.   Vitals and nursing note reviewed.   Constitutional:       Appearance: Normal appearance. Well-developed.   Pulmonary:      Effort: Pulmonary effort is normal.      Breath sounds: Normal breath sounds. No wheezing. No rhonchi. No rales.   Cardiovascular:      Normal rate. Regular rhythm.   Pulses:     Intact distal pulses.   Abdominal:      General: Bowel sounds are normal.      Palpations: Abdomen is soft.   Neurological:      Mental Status: Alert.             ECG 12 Lead    Date/Time: 5/27/2021 9:45 AM  Performed by: Lucinda Saucedo PA  Authorized by: Lucinda Saucedo PA   Comparison: compared with previous ECG from 10/22/2020  Similar to previous ECG  Rhythm: sinus rhythm  Rate: normal  Conduction: 1st degree AV block  QRS axis: normal    Clinical impression: abnormal EKG           St. Jack ICD interrogation:  Programmed DDDR base rate 60.    Atrial pacing 94%, P wave greater than 5, threshold 0.75, impedance 440 ohms.  Ventricular pacing 15%, R wave 11.4, threshold 1, impedance 330 ohms.  Battery voltage is a 88%.  Charge time 8.4 seconds.  She has had " mode switching less than 1% of time with a maximum 3 minutes in duration.  She does have a home monitor and is actively transmitting.  Essentially this is a normal device interrogation.  Assessment/ Plan   Diagnoses and all orders for this visit:    1. Hypertrophic obstructive cardiomyopathy (CMS/HCC) (Primary): Overall patient is doing well and asymptomatic.  I do believe that her shortness of breath and pedal edema correlated with long car ride and excessive sodium intake.  However its been over a year since echocardiogram was assessed.  Will order echocardiogram to be done here in the [place] office and we will call with results.  Otherwise continue current medical regimen return to clinic 6 months with repeat EKG and [employer] Jack interrogation or sooner as needed.    2. SVT (supraventricular tachycardia) (CMS/HCC): Device interrogation is essentially within normal limits.  She appears to have brief episodes of atrial tachycardia.  She had previously been taking low-dose aspirin and is no longer taking that.  I advised her to continue with the low-dose aspirin 81 mg daily.  Again return to clinic in 6 months with repeat device check.  She does do remote downloads as well.    3. Neurocardiogenic syncope: No recurrent episodes.  Continue to monitor.  We discussed the importance of hydration particularly in the hot months and avoidance of excessive sodium intake.          Lucinda Saucedo PA-C functioning independently.  5/27/2021 09:18 EDT

## 2021-06-15 ENCOUNTER — TELEPHONE (OUTPATIENT)
Dept: CARDIOLOGY | Facility: CLINIC | Age: 56
End: 2021-06-15

## 2021-06-15 NOTE — TELEPHONE ENCOUNTER
Called pt and left a voicemail for her to contact the office.      Lucinda Saucedo PA Elliott-Williams, April M, RN  Can you call this patient and let her know that her echo really looks okay.  EF is normal.  HOCM - noted.  Looks good.  Would not change anything based on findings.  Keep follow up appt.   Thank you.   GREGORY Pavon

## 2021-09-08 PROCEDURE — 93296 REM INTERROG EVL PM/IDS: CPT | Performed by: INTERNAL MEDICINE

## 2021-09-08 PROCEDURE — 93295 DEV INTERROG REMOTE 1/2/MLT: CPT | Performed by: INTERNAL MEDICINE

## 2021-12-08 PROCEDURE — 93296 REM INTERROG EVL PM/IDS: CPT | Performed by: STUDENT IN AN ORGANIZED HEALTH CARE EDUCATION/TRAINING PROGRAM

## 2021-12-08 PROCEDURE — 93295 DEV INTERROG REMOTE 1/2/MLT: CPT | Performed by: STUDENT IN AN ORGANIZED HEALTH CARE EDUCATION/TRAINING PROGRAM

## 2022-01-19 ENCOUNTER — TELEPHONE (OUTPATIENT)
Dept: CARDIOLOGY | Facility: CLINIC | Age: 57
End: 2022-01-19

## 2022-01-19 NOTE — TELEPHONE ENCOUNTER
Spoke with patient regarding her home monitor not connecting. Asked her to check the connections and to send in a manual. I also told her to call me back before 4:00pm for assistance if she needed it.

## 2022-02-24 ENCOUNTER — OFFICE VISIT (OUTPATIENT)
Dept: CARDIOLOGY | Facility: CLINIC | Age: 57
End: 2022-02-24

## 2022-02-24 VITALS
BODY MASS INDEX: 26.82 KG/M2 | HEIGHT: 65 IN | HEART RATE: 64 BPM | SYSTOLIC BLOOD PRESSURE: 102 MMHG | WEIGHT: 161 LBS | DIASTOLIC BLOOD PRESSURE: 68 MMHG | OXYGEN SATURATION: 98 %

## 2022-02-24 DIAGNOSIS — I48.3 TYPICAL ATRIAL FLUTTER: ICD-10-CM

## 2022-02-24 DIAGNOSIS — Z95.810 ICD (IMPLANTABLE CARDIOVERTER-DEFIBRILLATOR) IN PLACE: Primary | ICD-10-CM

## 2022-02-24 DIAGNOSIS — I42.1 HYPERTROPHIC OBSTRUCTIVE CARDIOMYOPATHY: ICD-10-CM

## 2022-02-24 PROCEDURE — 93000 ELECTROCARDIOGRAM COMPLETE: CPT | Performed by: STUDENT IN AN ORGANIZED HEALTH CARE EDUCATION/TRAINING PROGRAM

## 2022-02-24 PROCEDURE — 93283 PRGRMG EVAL IMPLANTABLE DFB: CPT | Performed by: STUDENT IN AN ORGANIZED HEALTH CARE EDUCATION/TRAINING PROGRAM

## 2022-02-24 PROCEDURE — 99215 OFFICE O/P EST HI 40 MIN: CPT | Performed by: STUDENT IN AN ORGANIZED HEALTH CARE EDUCATION/TRAINING PROGRAM

## 2022-02-24 NOTE — PROGRESS NOTES
Cardiac Electrophysiology Outpatient Note  [facility]    Office Visit     Smiley Sibley  [id number]  02/24/2022    Primary Care Physician: Chloe Childers PA-C    Referred By: No ref. provider found    Subjective     Chief Complaint   Patient presents with   • Hypertrophic obstructive cardiomyopathy (CMS/HCC)       History of Present Illness:   Smiley Sibley is a 56 y.o. female who presents to my electrophysiology clinic for follow up of hypertrophic cardiomyopathy, status post ICD for primary prevention,, atrial flutter status post ablation.   She was last seen in clinic approximately 9 months ago.  She is overall done well since that time, however continues to have some dyspnea on exertion.  She does not feel that this is worsened, however it is somewhat impeding on her daily living.  He denies chest pain, palpitations, orthopnea, PND, or lower extremity swelling.  She does note that her brother and mother have been seen at [facility] and of undergone myomectomies there.    Past Medical History:   Diagnosis Date   • Abnormal ECG 1998   • Anxiety and depression 10/21/2016   • Arrhythmia    • Congenital heart disease 1998   • Coronary artery disease    • Heart murmur 1966   • Hyperlipidemia 2018   • Hypertrophic obstructive cardiomyopathy (HCC) 10/21/2016   • Hypertrophic obstructive cardiomyopathy (HCC)    • Migraine headache 10/21/2016   • Mitral valve prolapse 1998   • Neurocardiogenic syncope 10/21/2016   • Nonsustained ventricular tachycardia (HCC) 10/21/2016   • Valvular disease 1998       Past Surgical History:   Procedure Laterality Date   • ABLATION OF DYSRHYTHMIC FOCUS  09/13/2018   • CARDIAC CATHETERIZATION  2010   • CARDIAC DEFIBRILLATOR PLACEMENT  07/29/2010   • CARDIAC ELECTROPHYSIOLOGY PROCEDURE N/A 9/13/2018    Procedure: Ablation SVT;  Surgeon: Maximino Nassar DO;  Location: [facility] INVASIVE LOCATION;  Service: Cardiovascular   • CARDIAC  "ELECTROPHYSIOLOGY PROCEDURE N/A 7/20/2020    Procedure: ICD DC generator change-  3-4 weeks [facility];  Surgeon: Sanya Stiles MD;  Location: [facility] INVASIVE LOC[facility];  Service: Cardiology;  Laterality: N/A;   • COLONOSCOPY     • DILATATION AND CURETTAGE     • HYSTEROTOMY     • INSERT / REPLACE / REMOVE PACEMAKER  07/29/2010       Family History   Problem Relation Age of Onset   • Arrhythmia Brother    • Heart disease Brother    • Arrhythmia Mother    • Heart disease Mother        Social History     Socioeconomic History   • Marital status:    Tobacco Use   • Smoking status: Never Smoker   • Smokeless tobacco: Never Used   Substance and Sexual Activity   • Alcohol use: Yes     Alcohol/week: 1.0 - 2.0 standard drink     Types: 1 - 2 Glasses of wine per week     Comment: 1-2 times/year   • Drug use: No   • Sexual activity: Yes     Partners: Male     Birth control/protection: Surgical         Current Outpatient Medications:   •  sotalol (BETAPACE) 80 MG tablet, Take 1 tablet by mouth Every 12 (Twelve) Hours., Disp: 60 tablet, Rfl: 5    Allergies:   No Known Allergies    Objective   Vital Signs: Blood pressure 102/68, pulse 64, height 165.1 cm (65\"), weight 73 kg (161 lb), SpO2 98 %.    PHYSICAL EXAM  General appearance: Awake, alert, cooperative  Head: Normocephalic, without obvious abnormality, atraumatic  Neck: No JVD  Lungs: Clear to ascultation bilaterally  Heart: Regular rate and rhythm, no murmurs, 2+ LE pulses, no lower extremity swelling  Skin: Skin color, turgor normal, no rashes or lesions  Neurologic: Grossly normal     Lab Results   Component Value Date    GLUCOSE 106 (H) 07/17/2020    CALCIUM 10.3 07/17/2020     07/17/2020    K 4.6 07/17/2020    CO2 28.0 07/17/2020     07/17/2020    BUN 18 07/17/2020    CREATININE 1.04 (H) 07/17/2020    EGFRIFNONA 55 (L) 07/17/2020    BCR 17.3 07/17/2020    ANIONGAP 10.0 07/17/2020     Lab Results   Component Value Date    WBC 10.26 07/17/2020    HGB " 14.5 07/17/2020    HCT 45.0 07/17/2020    MCV 95.9 07/17/2020     07/17/2020     No results found for: INR, PROTIME  No results found for: TSH, U1IWWVJ, H1FWKXP, THYROIDAB       Results for orders placed in visit on 06/11/21    Adult Transthoracic Echo Complete w/ Color, Spectral and Contrast if necessary per protocol    Interpretation Summary  · Estimated left ventricular EF = 50% Estimated left ventricular EF was in disagreement with the calculated left ventricular EF. Left ventricular ejection fraction appears to be 51 - 55%. Left ventricular systolic function is normal.  · Left ventricular wall thickness is consistent with moderate to severe septal asymmetric hypertrophy.  · Left ventricular mass index is increased.  · The findings are consistent with non-obstructive, hypertrophic cardiomyopathy.  · Left ventricular diastolic function is consistent with (grade I) impaired relaxation.  · Estimated right ventricular systolic pressure from tricuspid regurgitation is markedly elevated (>55 mmHg).  · The right atrial cavity is moderately dilated.  · The left atrial cavity is moderately dilated  · Moderate Tr and Mild MR         I personally viewed and interpreted the patient's EKG/Telemetry/lab data      ECG 12 Lead    Date/Time: 2/24/2022 3:27 PM  Performed by: See Paulino MD  Authorized by: See Paulino MD   Comparison: compared with previous ECG from 5/27/2021  Similar to previous ECG  Comments: Atrial pacing with intact AV conduction, nonspecific T wave abnormalities            Smiley Sibley  reports that she has never smoked. She has never used smokeless tobacco..    Assessment & Plan    1. Hypertrophic obstructive cardiomyopathy (HCC)  She has a history of hypertrophic obstructive cardiomyopathy.  She did have an echocardiogram last year which showed no significant obstruction.  However she does have persistent dyspnea on exertion.  She was noted to have significantly elevated pulmonary pressures  with RVSP greater than 55 mmHg.  We discussed options going forward for management of her hypertrophy.  We discussed referral to a general cardiologist more adept at treating this, and discussed this could be either done with referral within our group here or a referral to [facility].  We also discussed different testing that we could obtain further investigate her dyspnea including treadmill echocardiogram and cardiac MRI.  I discussed with the [employer] representatives and her device does seem to be MRI conditional.  She would prefer a referral to [facility] as her brother and mother have been treated there and undergone myomectomies.  I think this is completely reasonable.  I will defer on obtaining any other imaging study prior to that to let them obtain their own studies there.  She also has not had any genetic testing done as far as I know, and for now I will defer this to [place].      2. ICD (implantable cardioverter-defibrillator) in place  Her [employer] dual-chamber ICD was interrogated today and is functioning well she has approximately 6 years of battery life remaining.  She is pacing 94% of the time in the atrium and 19% of the time in the ventricle.  She had less than 1% atrial fibrillation burden.  Her pacing and sensing thresholds are all within normal limits.  No changes were made to her device settings today.    3. Typical atrial flutter (HCC)  History of typical atrial flutter status post ablation.  Per records she was noted to have brief runs of atrial fibrillation during that procedure.  She was started on sotalol and has since been maintained on that.  Despite the association of adverse events with sotalol and typical LVH, I do think it can be an effective drug in hypertrophic cardiomyopathy and therefore I think it is reasonable to continue.  She also was not anticoagulated and was reference to have a [id number] 2 score of 1.  However in the setting of hypertrophy the chads  score is irrelevant.  Since she has not had any recurrent of atrial fibrillation on her device I do not think she needs to start anticoagulation currently, however if she were to have recurrences of atrial fibrillation she would definitely need to start.    4. Pulmonary HTN  As above her echocardiogram showed a very high RVSP of greater than 55 mmHg.  I am not certain the etiology of this.  This may be as result of her hypertrophy, but it does seem somewhat out of proportion.  I will defer further work-up of this to [facility] as above.       Follow Up:  Return in about 6 months (around 8/24/2022) for Recheck, [employer]/ИРИНА.      Thank you for allowing me to participate in the care of your patient. Please do not hesitate to contact me with additional questions or concerns.      See Paulino M.D.  Cardiac Electrophysiologist  [facility]      I spent 42 minutes caring for Smiley on this date of service. This time includes time spent by me in the following activities:preparing for the visit, reviewing tests, obtaining and/or reviewing a separately obtained history, performing a medically appropriate examination and/or evaluation , counseling and educating the patient/family/caregiver, ordering medications, tests, or procedures, referring and communicating with other health care professionals , documenting information in the medical record, independently interpreting results and communicating that information with the patient/family/caregiver, and care coordination.

## 2022-02-28 ENCOUNTER — TELEPHONE (OUTPATIENT)
Dept: CARDIOLOGY | Facility: CLINIC | Age: 57
End: 2022-02-28

## 2022-03-09 PROCEDURE — 93296 REM INTERROG EVL PM/IDS: CPT | Performed by: STUDENT IN AN ORGANIZED HEALTH CARE EDUCATION/TRAINING PROGRAM

## 2022-03-09 PROCEDURE — 93295 DEV INTERROG REMOTE 1/2/MLT: CPT | Performed by: STUDENT IN AN ORGANIZED HEALTH CARE EDUCATION/TRAINING PROGRAM

## 2022-08-25 ENCOUNTER — OFFICE VISIT (OUTPATIENT)
Dept: CARDIOLOGY | Facility: CLINIC | Age: 57
End: 2022-08-25

## 2022-08-25 VITALS
HEIGHT: 65 IN | DIASTOLIC BLOOD PRESSURE: 60 MMHG | HEART RATE: 119 BPM | SYSTOLIC BLOOD PRESSURE: 92 MMHG | BODY MASS INDEX: 27.22 KG/M2 | WEIGHT: 163.4 LBS | OXYGEN SATURATION: 97 %

## 2022-08-25 DIAGNOSIS — I42.1 HYPERTROPHIC OBSTRUCTIVE CARDIOMYOPATHY: Primary | ICD-10-CM

## 2022-08-25 DIAGNOSIS — I47.29 NONSUSTAINED VENTRICULAR TACHYCARDIA: Primary | ICD-10-CM

## 2022-08-25 PROBLEM — I36.1 NONRHEUMATIC TRICUSPID VALVE REGURGITATION: Status: ACTIVE | Noted: 2022-06-01

## 2022-08-25 PROBLEM — I34.0 NONRHEUMATIC MITRAL VALVE REGURGITATION: Status: ACTIVE | Noted: 2022-06-01

## 2022-08-25 PROBLEM — I48.92 ATRIAL FLUTTER (HCC): Status: ACTIVE | Noted: 2022-06-01

## 2022-08-25 PROBLEM — R06.02 SOB (SHORTNESS OF BREATH): Status: ACTIVE | Noted: 2022-06-01

## 2022-08-25 PROBLEM — I50.33 ACUTE ON CHRONIC DIASTOLIC HEART FAILURE: Status: ACTIVE | Noted: 2022-06-01

## 2022-08-25 PROCEDURE — 93000 ELECTROCARDIOGRAM COMPLETE: CPT | Performed by: STUDENT IN AN ORGANIZED HEALTH CARE EDUCATION/TRAINING PROGRAM

## 2022-08-25 PROCEDURE — 99214 OFFICE O/P EST MOD 30 MIN: CPT | Performed by: STUDENT IN AN ORGANIZED HEALTH CARE EDUCATION/TRAINING PROGRAM

## 2022-08-25 RX ORDER — FUROSEMIDE 20 MG/1
TABLET ORAL
COMMUNITY
Start: 2022-06-01 | End: 2022-10-20

## 2022-08-25 NOTE — PROGRESS NOTES
Cardiac Electrophysiology Outpatient Note  [facility] at [facility]    Office Visit     Smiley Sibley  [id number]  08/25/2022    Primary Care Physician: Chloe Childers PA-C    Referred By: No ref. provider found    CC:   Chief Complaint   Patient presents with   • Cardiomyopathy       Problem List:  1. ***  a. ***    History of Present Illness:   Smiley Sibley is a 57 y.o. female who presents to the electrophysiology clinic for follow up of ***.      Past Surgical History:   Procedure Laterality Date   • ABLATION OF DYSRHYTHMIC FOCUS  09/13/2018   • CARDIAC CATHETERIZATION  2010   • CARDIAC DEFIBRILLATOR PLACEMENT  07/29/2010   • CARDIAC ELECTROPHYSIOLOGY PROCEDURE N/A 9/13/2018    Procedure: Ablation SVT;  Surgeon: Maximino Nassar DO;  Location:  [facility];  Service: Cardiovascular   • CARDIAC ELECTROPHYSIOLOGY PROCEDURE N/A 7/20/2020    Procedure: ICD DC generator change-  3-4 weeks SJ;  Surgeon: Sanya Stiles MD;  Location:  [facility];  Service: Cardiology;  Laterality: N/A;   • COLONOSCOPY     • DILATATION AND CURETTAGE     • HYSTEROTOMY     • INSERT / REPLACE / REMOVE PACEMAKER  07/29/2010       Family History   Problem Relation Age of Onset   • Arrhythmia Brother    • Heart disease Brother    • Arrhythmia Mother    • Heart disease Mother        Social History     Socioeconomic History   • Marital status:    Tobacco Use   • Smoking status: Never Smoker   • Smokeless tobacco: Never Used   Substance and Sexual Activity   • Alcohol use: Yes     Alcohol/week: 1.0 - 2.0 standard drink     Types: 1 - 2 Glasses of wine per week     Comment: 3-4 times/year   • Drug use: No   • Sexual activity: Yes     Partners: Male     Birth control/protection: Surgical         Current Outpatient Medications:   •  sotalol (BETAPACE) 80 MG tablet, Take 1 tablet by mouth Every 12 (Twelve) Hours., Disp: 60 tablet, Rfl: 5  •  dapagliflozin Propanediol  "10 MG tablet, Take 10 mg by mouth., Disp: , Rfl:   •  furosemide (LASIX) 20 MG tablet, , Disp: , Rfl:     Allergies:   No Known Allergies    Review of Systems:  ROS     Vital Signs: Blood pressure 92/60, pulse 119, height 165.1 cm (65\"), weight 74.1 kg (163 lb 6.4 oz), SpO2 97 %.    Physical Exam    Lab Results   Component Value Date    GLUCOSE 106 (H) 07/17/2020    CALCIUM 10.3 07/17/2020     07/17/2020    K 4.6 07/17/2020    CO2 28.0 07/17/2020     07/17/2020    BUN 18 07/17/2020    CREATININE 1.04 (H) 07/17/2020    EGFRIFNONA 55 (L) 07/17/2020    BCR 17.3 07/17/2020    ANIONGAP 10.0 07/17/2020     Lab Results   Component Value Date    WBC 10.26 07/17/2020    HGB 14.5 07/17/2020    HCT 45.0 07/17/2020    MCV 95.9 07/17/2020     07/17/2020     No results found for: INR, PROTIME  No results found for: TSH, P4QXFDH, A5PRKUY, THYROIDAB     Results for orders placed in visit on 06/11/21    Adult Transthoracic Echo Complete w/ Color, Spectral and Contrast if necessary per protocol    Interpretation Summary  · Estimated left ventricular EF = 50% Estimated left ventricular EF was in disagreement with the calculated left ventricular EF. Left ventricular ejection fraction appears to be 51 - 55%. Left ventricular systolic function is normal.  · Left ventricular wall thickness is consistent with moderate to severe septal asymmetric hypertrophy.  · Left ventricular mass index is increased.  · The findings are consistent with non-obstructive, hypertrophic cardiomyopathy.  · Left ventricular diastolic function is consistent with (grade I) impaired relaxation.  · Estimated right ventricular systolic pressure from tricuspid regurgitation is markedly elevated (>55 mmHg).  · The right atrial cavity is moderately dilated.  · The left atrial cavity is moderately dilated  · Moderate Tr and Mild MR      I personally viewed and interpreted the patient's EKG/Telemetry/lab data.    Procedures    Smiley Sibley  reports " "that she has never smoked. She has never used smokeless tobacco.. I have educated her on the risk of diseases from using tobacco products such as {Tobacco Cessation Diseases:05898::\"cancer\",\"COPD\",\"heart disease\"}.     I advised her to quit and she is {Willing/Not Willing to Quit Tobacco Products:43050}.    I spent {Time Spent Tobacco :24907} minutes counseling the patient.           Advance Care Planning   Advance Care Planning: {Advance Directive Status:13501}     Assessment & Plan    There are no diagnoses linked to this encounter.     Follow Up:  No follow-ups on file.        Bridget Cole, APRN  08/25/2022          "

## 2022-08-25 NOTE — PROGRESS NOTES
Cardiac Electrophysiology Outpatient Note  [facility] at [facility]    Office Visit     Smiley Sibley  [id number]  02/24/2022    Primary Care Physician: Chloe Childers PA-C    Referred By: No ref. provider found    Subjective     Chief Complaint   Patient presents with   • Cardiomyopathy       History of Present Illness:   Smiley Sibley is a 57 y.o. female who presents to my electrophysiology clinic for follow up of hypertrophic cardiomyopathy, status post ICD for primary prevention,, atrial flutter status post ablation.   She was last seen in clinic approximately 6 months ago.      She went to [facility] for evaluation of hypertrophic cardiomyopathy.  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  She was prescribed Lasix due to elevated BNP but has not yet started this.  She was also found to have an elevated hemoglobin and calcium.  There is also consideration of starting her on Farxiga but this has not happened yet.    She does feel palpitations from time to time.  She will often feel a sensation up in her neck of her heart beating.  This happens occasionally and usually last around 20 to 30 minutes.  There are no known triggers for this.      Past Medical History:   Diagnosis Date   • Abnormal ECG 1998   • Anxiety and depression 10/21/2016   • Arrhythmia    • Asthma    • Congenital heart disease 1998   • Coronary artery disease    • Heart murmur 1966   • Hyperlipidemia 2018   • Hypertrophic obstructive cardiomyopathy (HCC) 10/21/2016   • Hypertrophic obstructive cardiomyopathy (HCC)    • Migraine headache 10/21/2016   • Mitral valve prolapse 1998   • Neurocardiogenic syncope 10/21/2016   • Nonsustained ventricular tachycardia (HCC) 10/21/2016   • Sleep apnea 2013   • Valvular disease 1998       Past Surgical History:   Procedure Laterality Date   • ABLATION OF DYSRHYTHMIC FOCUS  09/13/2018   • CARDIAC CATHETERIZATION  2010   • CARDIAC  "DEFIBRILLATOR PLACEMENT  07/29/2010   • CARDIAC ELECTROPHYSIOLOGY PROCEDURE N/A 9/13/2018    Procedure: Ablation SVT;  Surgeon: Maximino Nassar DO;  Location:  [facility];  Service: Cardiovascular   • CARDIAC ELECTROPHYSIOLOGY PROCEDURE N/A 7/20/2020    Procedure: ICD DC generator change-  3-4 weeks SJM;  Surgeon: Sanya Stiles MD;  Location:  [facility];  Service: Cardiology;  Laterality: N/A;   • COLONOSCOPY     • DILATATION AND CURETTAGE     • HYSTEROTOMY     • INSERT / REPLACE / REMOVE PACEMAKER  07/29/2010       Family History   Problem Relation Age of Onset   • Arrhythmia Brother    • Heart disease Brother    • Arrhythmia Mother    • Heart disease Mother        Social History     Socioeconomic History   • Marital status:    Tobacco Use   • Smoking status: Never Smoker   • Smokeless tobacco: Never Used   Substance and Sexual Activity   • Alcohol use: Yes     Alcohol/week: 1.0 - 2.0 standard drink     Types: 1 - 2 Glasses of wine per week     Comment: 3-4 times/year   • Drug use: No   • Sexual activity: Yes     Partners: Male     Birth control/protection: Surgical         Current Outpatient Medications:   •  sotalol (BETAPACE) 80 MG tablet, Take 1 tablet by mouth Every 12 (Twelve) Hours., Disp: 60 tablet, Rfl: 5  •  dapagliflozin Propanediol 10 MG tablet, Take 10 mg by mouth., Disp: , Rfl:   •  furosemide (LASIX) 20 MG tablet, , Disp: , Rfl:     Allergies:   No Known Allergies    Objective   Vital Signs: Blood pressure 92/60, pulse 119, height 165.1 cm (65\"), weight 74.1 kg (163 lb 6.4 oz), SpO2 97 %.    PHYSICAL EXAM  General appearance: Awake, alert, cooperative  Head: Normocephalic, without obvious abnormality, atraumatic  Neck: No JVD  Lungs: Clear to ascultation bilaterally  Heart: Regular rate and rhythm, no murmurs, 2+ LE pulses, no lower extremity swelling  Skin: Skin color, turgor normal, no rashes or lesions  Neurologic: Grossly normal     Lab Results "   Component Value Date    GLUCOSE 106 (H) 07/17/2020    CALCIUM 10.3 07/17/2020     07/17/2020    K 4.6 07/17/2020    CO2 28.0 07/17/2020     07/17/2020    BUN 18 07/17/2020    CREATININE 1.04 (H) 07/17/2020    EGFRIFNONA 55 (L) 07/17/2020    BCR 17.3 07/17/2020    ANIONGAP 10.0 07/17/2020     Lab Results   Component Value Date    WBC 10.26 07/17/2020    HGB 14.5 07/17/2020    HCT 45.0 07/17/2020    MCV 95.9 07/17/2020     07/17/2020     No results found for: INR, PROTIME  No results found for: TSH, D6BLHXM, X0ETJYU, THYROIDAB       Results for orders placed in visit on 06/11/21    Adult Transthoracic Echo Complete w/ Color, Spectral and Contrast if necessary per protocol    Interpretation Summary  · Estimated left ventricular EF = 50% Estimated left ventricular EF was in disagreement with the calculated left ventricular EF. Left ventricular ejection fraction appears to be 51 - 55%. Left ventricular systolic function is normal.  · Left ventricular wall thickness is consistent with moderate to severe septal asymmetric hypertrophy.  · Left ventricular mass index is increased.  · The findings are consistent with non-obstructive, hypertrophic cardiomyopathy.  · Left ventricular diastolic function is consistent with (grade I) impaired relaxation.  · Estimated right ventricular systolic pressure from tricuspid regurgitation is markedly elevated (>55 mmHg).  · The right atrial cavity is moderately dilated.  · The left atrial cavity is moderately dilated  · Moderate Tr and Mild MR         I personally viewed and interpreted the patient's EKG/Telemetry/lab data      ECG 12 Lead    Date/Time: 8/25/2022 12:17 PM  Performed by: See Paulino MD  Authorized by: See Paulino MD   Comparison: compared with previous ECG from 2/24/2022  Comparison to previous ECG: Accelerated junctional rhythm has replaced sinus rhythm              Smiley Sibley  reports that she has never smoked. She has never used  smokeless tobacco..    Assessment & Plan    1. Hypertrophic obstructive cardiomyopathy (HCC)  She has a history of hypertrophic obstructive cardiomyopathy.  She has been seen in [facility] for evaluation of this.  She has significant elevation in her pulmonary pressures.  She was started on Lasix and also there is consideration of starting her on Farxiga for this.  In addition to seeing [facility], I am going to set her up to see Dr. Kulkarni that she can have more local management of her hypertrophic cardiomyopathy as well.      2. ICD (implantable cardioverter-defibrillator) in place  Her [employer] dual-chamber ICD was interrogated today and is functioning well she has approximately 5.5 of battery life remaining.  She is pacing 94% of the time in the atrium and 11% of the time in the ventricle.  She had less than 1% atrial fibrillation burden.  These episodes were all less than 1 minute and appeared to be an organized atrial flutter.  Her pacing and sensing thresholds are all within normal limits.  No changes were made to her device settings today.    3. Typical atrial flutter (HCC)  History of typical atrial flutter status post ablation.  Per records she was noted to have brief runs of atrial fibrillation during that procedure.  She was started on sotalol and has since been maintained on that.  She has had short episodes of atrial flutter on her device, however these of all been less than 1 minute.  We did have a discussion about the pros and cons of anticoagulation.  At the current time as all these episodes of been less than 1 minute I do not think we need to start it.  Should she start having more episodes we may need to reconsider this.    4. Pulmonary HTN  As above her echocardiogram showed a very high RVSP of greater than 55 mmHg.  I am not certain the etiology of this.  This may be as result of her hypertrophy, but it does seem somewhat out of proportion.      5.  Accelerated junctional  rhythm  She was noted to have an accelerated junctional rhythm on her EKG today at about 113 bpm.  On intracardiac electrograms this was also consistent with a junctional rhythm.  This was terminated during device interrogation.  No tracings of this were saved.  It seems as though it terminated very quickly with DDD pacing.  This would suggest a possible reentrant source for this but is uncertain.  We discussed different options.  She is going to keep a log to see how much this is happening and how much it is bothering her.  If she is having frequent symptoms we could consider going up on her sotalol.  At the current time will wait on this and see how she is doing.    Follow Up:  Return in about 6 months (around 2/25/2023).      Thank you for allowing me to participate in the care of your patient. Please do not hesitate to contact me with additional questions or concerns.      See Paulino M.D.  Cardiac Electrophysiologist  [facility] / [facility]

## 2022-08-26 DIAGNOSIS — I42.1 HYPERTROPHIC OBSTRUCTIVE CARDIOMYOPATHY: Primary | ICD-10-CM

## 2022-09-07 PROCEDURE — 93295 DEV INTERROG REMOTE 1/2/MLT: CPT | Performed by: STUDENT IN AN ORGANIZED HEALTH CARE EDUCATION/TRAINING PROGRAM

## 2022-09-07 PROCEDURE — 93296 REM INTERROG EVL PM/IDS: CPT | Performed by: STUDENT IN AN ORGANIZED HEALTH CARE EDUCATION/TRAINING PROGRAM

## 2022-09-10 LAB
BUN SERPL-MCNC: 22 MG/DL (ref 6–24)
BUN/CREAT SERPL: 24 (ref 9–23)
CALCIUM SERPL-MCNC: 10.2 MG/DL (ref 8.7–10.2)
CHLORIDE SERPL-SCNC: 100 MMOL/L (ref 96–106)
CO2 SERPL-SCNC: 25 MMOL/L (ref 20–29)
CREAT SERPL-MCNC: 0.93 MG/DL (ref 0.57–1)
EGFRCR-CYS SERPLBLD CKD-EPI 2021: 72 ML/MIN/1.73
GLUCOSE SERPL-MCNC: 103 MG/DL (ref 65–99)
POTASSIUM SERPL-SCNC: 4.7 MMOL/L (ref 3.5–5.2)
SODIUM SERPL-SCNC: 141 MMOL/L (ref 134–144)

## 2022-09-22 ENCOUNTER — DOCUMENTATION (OUTPATIENT)
Dept: CARDIOLOGY | Facility: CLINIC | Age: 57
End: 2022-09-22

## 2022-09-22 NOTE — PROGRESS NOTES
Patient had approximately 1 hour 20-minute episode of atrial fibrillation/atrial flutter on her device.  I called and discussed this with her.  Given her hypertrophic cardiomyopathy she is at an increased risk of thrombus associated with atrial fibrillation.  After discussing the pros and cons we elected to start her on Eliquis for anticoagulation.

## 2022-10-19 NOTE — PROGRESS NOTES
OFFICE VISIT  NOTE  [facility]      Name: Smiley Sibley    Date: 10/20/2022  MRN:  [id number]  :  1965      REFERRING/PRIMARY PROVIDER:  Chloe Childers PA-C    Chief Complaint   Patient presents with   • Cardiomyopathy   • Device Check SJ       HPI: Smiley Sibley is a 57 y.o. female who presents today for new consultation for hypertrophic obstructive cardiomyopathy.  Diagnosed 20 to 30 years ago after birth of her daughter.  Fairly stable over the years.  She sees [facility] for this condition as well.  Last echo 2022 at [facility] showed EF 50-55% with moderate MR due to Paul, but no intracavitary gradient at rest or provocation, with interventricular septum measuring 1.6 cm.  Echo 2021 showed septum 2 cm.  She reports increasing dyspnea on exertion over the past 6 to 12 months despite stability of hokum.  She has an [facility] defibrillator in place follow Dr. Paulino. diagnosis of atrial fibrillation, started on Eliquis.  She is also on sotalol.     Past Medical History:   Diagnosis Date   • Abnormal ECG    • Anxiety and depression 10/21/2016   • Arrhythmia    • Asthma    • Congenital heart disease    • Coronary artery disease    • Heart murmur    • Hyperlipidemia    • Hypertrophic obstructive cardiomyopathy (HCC) 10/21/2016   • Hypertrophic obstructive cardiomyopathy (HCC)    • Migraine headache 10/21/2016   • Mitral valve prolapse    • Neurocardiogenic syncope 10/21/2016   • Nonsustained ventricular tachycardia 10/21/2016   • Sleep apnea    • Valvular disease        Past Surgical History:   Procedure Laterality Date   • ABLATION OF DYSRHYTHMIC FOCUS  2018   • CARDIAC CATHETERIZATION     • CARDIAC DEFIBRILLATOR PLACEMENT  2010   • CARDIAC ELECTROPHYSIOLOGY PROCEDURE N/A 2018    Procedure: Ablation SVT;  Surgeon: Maximino Nassar DO;  Location: [facility] INVASIVE LOCATION;  Service: Cardiovascular   •  "CARDIAC ELECTROPHYSIOLOGY PROCEDURE N/A 7/20/2020    Procedure: ICD DC generator change-  3-4 weeks SJ;  Surgeon: Sanya Stiles MD;  Location: [facility] INVASIVE LOCATION;  Service: Cardiology;  Laterality: N/A;   • COLONOSCOPY     • DILATATION AND CURETTAGE     • HYSTEROTOMY     • INSERT / REPLACE / REMOVE PACEMAKER  07/29/2010       Social History     Socioeconomic History   • Marital status:    Tobacco Use   • Smoking status: Never   • Smokeless tobacco: Never   Substance and Sexual Activity   • Alcohol use: Yes     Alcohol/week: 1.0 - 2.0 standard drink     Types: 1 - 2 Glasses of wine per week     Comment: 3-4 times/year   • Drug use: No   • Sexual activity: Yes     Partners: Male     Birth control/protection: Surgical       Family History   Problem Relation Age of Onset   • Arrhythmia Brother    • Heart disease Brother    • Arrhythmia Mother    • Heart disease Mother         ROS:   Constitutional no fever,  no weight loss   Skin no rash, no subcutaneous nodules   Otolaryngeal no difficulty swallowing   Cardiovascular See HPI   Pulmonary no cough, no sputum production   Gastrointestinal no constipation, no diarrhea   Genitourinary no dysuria, no hematuria   Hematologic no easy bruisability, no abnormal bleeding   Musculoskeletal no muscle pain   Neurologic no dizziness, no falls         No Known Allergies      Current Outpatient Medications:   •  apixaban (ELIQUIS) 5 MG tablet tablet, Take 1 tablet by mouth 2 (Two) Times a Day., Disp: 60 tablet, Rfl: 3  •  furosemide (LASIX) 20 MG tablet, 1 tablet Daily As Needed., Disp: , Rfl:   •  sotalol (BETAPACE) 80 MG tablet, Take 1 tablet by mouth Every 12 (Twelve) Hours., Disp: 60 tablet, Rfl: 5  •  dapagliflozin Propanediol 10 MG tablet, Take 10 mg by mouth., Disp: , Rfl:     Vitals:    10/20/22 1250   BP: 118/70   BP Location: Right arm   Patient Position: Sitting   Pulse: 60   SpO2: 99%   Weight: 75.1 kg (165 lb 9.6 oz)   Height: 165.1 cm (65\")     Body " mass index is 27.56 kg/m².    PHYSICAL EXAM:    General Appearance:   · well developed  · well nourished  HENT:   · oropharynx moist  · lips not cyanotic  Neck:  · thyroid not enlarged  · supple  Respiratory:  · no respiratory distress  · normal breath sounds  · no rales  Cardiovascular:  · no jugular venous distention  · regular rhythm  · apical impulse normal  · S1 normal, S2 normal  · no S3, no S4   · no murmur  · no rub, no thrill  · carotid pulses normal; no bruit  · lower extremity edema: none      Musculoskeletal:  · no clubbing of fingers.   · normocephalic, head atraumatic  Skin:   · warm, dry  Psychiatric:  · judgement and insight appropriate  · normal mood and affect    RESULTS:     ECG 12 Lead    Date/Time: 10/20/2022 1:28 PM  Performed by: Brian Kulkarni MD  Authorized by: Brian Kulkarni MD   Comparison: compared with previous ECG from 8/25/2022  Similar to previous ECG  Rhythm: paced  Rate: normal  BPM: 60  QRS axis: normal    Clinical impression: non-specific ECG  Comments: A paced.        [employer] dual-chamber ICD interrogation adjustments made today, see scanned report.  94% a paced P wave greater than 5 mV, threshold 0.75 V, 400 and ohms impedance, RV 19% paced, R wave 11.4, threshold 0.75 V, impedance 310 ohms.  Longest atrial fibrillation 1.2 hours VT x2 actually A. fib with RVR.  A pace turned off on PVC  Results for orders placed in visit on 06/11/21    Adult Transthoracic Echo Complete w/ Color, Spectral and Contrast if necessary per protocol    Interpretation Summary  · Estimated left ventricular EF = 50% Estimated left ventricular EF was in disagreement with the calculated left ventricular EF. Left ventricular ejection fraction appears to be 51 - 55%. Left ventricular systolic function is normal.  · Left ventricular wall thickness is consistent with moderate to severe septal asymmetric hypertrophy.  · Left ventricular mass index is increased.  · The findings are consistent with  non-obstructive, hypertrophic cardiomyopathy.  · Left ventricular diastolic function is consistent with (grade I) impaired relaxation.  · Estimated right ventricular systolic pressure from tricuspid regurgitation is markedly elevated (>55 mmHg).  · The right atrial cavity is moderately dilated.  · The left atrial cavity is moderately dilated  · Moderate Tr and Mild MR        Labs:  Lab Results   Component Value Date    AST 37 (H) 07/17/2020    ALT 40 (H) 07/17/2020     No results found for: HGBA1C  No components found for: CREATINININE  eGFR Non  Amer   Date Value Ref Range Status   07/17/2020 55 (L) >60 mL/min/1.73 Final   09/13/2018 65 >60 mL/min/1.73 Final       Most recent PCP note, imaging tests, and labs reviewed.    ASSESSMENT:  Problem List Items Addressed This Visit        Cardiac and Vasculature    Hypertrophic nonobstructive cardiomyopathy (HCC) - Primary    Overview     6/11/21 Echo: EF 50%, moderate to severe septal asymmetric hypertrophy, left ventricular mass index increase, non-obstructive hypertrophic cardiomyopathy, grade I impaired relaxation, RVSP >55 mmHg, right and left atrial moderately dilated, moderate TR and mild MR  A. Left heart catheterization in 2005 with normal coronary arteries.  B. Echocardiogram 02/05/2005 with septal hypertrophy, PAUL, moderate MR, IHSS with a gradient of 22, EF 60%.  C. Stress echocardiogram, 07/2010 normal per patient, no data         ICD (implantable cardioverter-defibrillator) in place       Pulmonary and Pneumonias    SOB (shortness of breath)       PLAN:    1.  Hypertrophic obstructive cardiomyopathy:  No gradient on recent echo 6/2022 with septum measuring 1.6 cm with moderate MR due to Paul.  She follows with [facility]  Without a gradient she is unlikely to qualify for [place], but encouraged her to discuss this with her [facility] provider.  She is on sotalol for beta-blocker  Discussed importance of adequate hydration    In clinic  recently recommended Farxiga, she will start it soon.  Lasix did not help her symptoms therefore she stopped it.    2.  Dyspnea on exertion:  Out of proportion of her HOCM, no gradient on recent echo, therefore we will pursue other causes of dyspnea  Coronary CTA ordered  PFTs ordered    3.  Paroxysmal atrial fibrillation:  Recent 1.2-hour episode, Eliquis started, continue sotalol, EKG stable    4.  Presence of dual-chamber [employer] ICD:  Changes made today see above note    Advance Care Planning   ACP discussion was held with the patient during this visit. Patient does not have an advance directive, information provided.         Return to clinic in 6 months, or sooner as needed.    Thank you for the opportunity to share in the care of your patient; please do not hesitate to call me with any questions.     Brian Kulkarni MD, [facility]  Office: [phone] [address] [place] [address]    10/20/22

## 2022-10-20 ENCOUNTER — OFFICE VISIT (OUTPATIENT)
Dept: CARDIOLOGY | Facility: CLINIC | Age: 57
End: 2022-10-20

## 2022-10-20 VITALS
OXYGEN SATURATION: 99 % | HEART RATE: 60 BPM | DIASTOLIC BLOOD PRESSURE: 70 MMHG | WEIGHT: 165.6 LBS | BODY MASS INDEX: 27.59 KG/M2 | HEIGHT: 65 IN | SYSTOLIC BLOOD PRESSURE: 118 MMHG

## 2022-10-20 DIAGNOSIS — I42.2 HYPERTROPHIC NONOBSTRUCTIVE CARDIOMYOPATHY: Primary | ICD-10-CM

## 2022-10-20 DIAGNOSIS — R06.02 SOB (SHORTNESS OF BREATH): ICD-10-CM

## 2022-10-20 DIAGNOSIS — Z95.810 ICD (IMPLANTABLE CARDIOVERTER-DEFIBRILLATOR) IN PLACE: ICD-10-CM

## 2022-10-20 PROCEDURE — 93000 ELECTROCARDIOGRAM COMPLETE: CPT | Performed by: INTERNAL MEDICINE

## 2022-10-20 PROCEDURE — 93283 PRGRMG EVAL IMPLANTABLE DFB: CPT | Performed by: INTERNAL MEDICINE

## 2022-10-20 PROCEDURE — 99214 OFFICE O/P EST MOD 30 MIN: CPT | Performed by: INTERNAL MEDICINE

## 2022-10-20 RX ORDER — FUROSEMIDE 20 MG/1
20 TABLET ORAL DAILY PRN
Status: SHIPPED
Start: 2022-10-20 | End: 2022-11-29

## 2022-11-08 ENCOUNTER — HOSPITAL ENCOUNTER (OUTPATIENT)
Dept: PULMONOLOGY | Facility: HOSPITAL | Age: 57
Discharge: HOME | End: 2022-11-08
Admitting: INTERNAL MEDICINE

## 2022-11-08 DIAGNOSIS — R06.02 SOB (SHORTNESS OF BREATH): ICD-10-CM

## 2022-11-08 PROCEDURE — 94060 EVALUATION OF WHEEZING: CPT | Performed by: INTERNAL MEDICINE

## 2022-11-08 PROCEDURE — 94729 DIFFUSING CAPACITY: CPT | Performed by: INTERNAL MEDICINE

## 2022-11-08 PROCEDURE — 94727 GAS DIL/WSHOT DETER LNG VOL: CPT

## 2022-11-08 PROCEDURE — 94060 EVALUATION OF WHEEZING: CPT

## 2022-11-08 PROCEDURE — 94729 DIFFUSING CAPACITY: CPT

## 2022-11-08 PROCEDURE — 94727 GAS DIL/WSHOT DETER LNG VOL: CPT | Performed by: INTERNAL MEDICINE

## 2022-11-08 RX ORDER — ALBUTEROL SULFATE 2.5 MG/3ML
2.5 SOLUTION RESPIRATORY (INHALATION) ONCE
Status: COMPLETED | OUTPATIENT
Start: 2022-11-08 | End: 2022-11-08

## 2022-11-08 RX ADMIN — ALBUTEROL SULFATE 2.5 MG: 2.5 SOLUTION RESPIRATORY (INHALATION) at 14:23

## 2022-11-10 NOTE — PROGRESS NOTES
Please inform the patient of their test results.  Mildly abnormal, refer to pulmonary for formal evaluation. Thank you.

## 2022-11-11 ENCOUNTER — TELEPHONE (OUTPATIENT)
Dept: CARDIOLOGY | Facility: CLINIC | Age: 57
End: 2022-11-11

## 2022-11-11 DIAGNOSIS — R94.2 ABNORMAL PFTS: ICD-10-CM

## 2022-11-11 DIAGNOSIS — R06.02 SOB (SHORTNESS OF BREATH): Primary | ICD-10-CM

## 2022-11-11 NOTE — TELEPHONE ENCOUNTER
LVM for pt with PFT results. Pulmonary referral placed Will await pt return call if further questions.

## 2022-11-11 NOTE — TELEPHONE ENCOUNTER
----- Message from Brian Kulkarni MD sent at 11/10/2022  5:25 PM EST -----  Please inform the patient of their test results.  Mildly abnormal, refer to pulmonary for formal evaluation. Thank you.

## 2022-11-29 ENCOUNTER — OFFICE VISIT (OUTPATIENT)
Dept: PULMONOLOGY | Facility: CLINIC | Age: 57
End: 2022-11-29

## 2022-11-29 VITALS
HEIGHT: 65 IN | DIASTOLIC BLOOD PRESSURE: 78 MMHG | HEART RATE: 55 BPM | OXYGEN SATURATION: 98 % | TEMPERATURE: 97.4 F | SYSTOLIC BLOOD PRESSURE: 110 MMHG | BODY MASS INDEX: 27.26 KG/M2 | WEIGHT: 163.6 LBS

## 2022-11-29 DIAGNOSIS — I42.2 HYPERTROPHIC NONOBSTRUCTIVE CARDIOMYOPATHY: ICD-10-CM

## 2022-11-29 DIAGNOSIS — J45.20 MILD INTERMITTENT ASTHMA WITHOUT COMPLICATION: ICD-10-CM

## 2022-11-29 DIAGNOSIS — R06.02 SOB (SHORTNESS OF BREATH): Primary | ICD-10-CM

## 2022-11-29 PROCEDURE — 94729 DIFFUSING CAPACITY: CPT | Performed by: INTERNAL MEDICINE

## 2022-11-29 PROCEDURE — 94375 RESPIRATORY FLOW VOLUME LOOP: CPT | Performed by: INTERNAL MEDICINE

## 2022-11-29 PROCEDURE — 94726 PLETHYSMOGRAPHY LUNG VOLUMES: CPT | Performed by: INTERNAL MEDICINE

## 2022-11-29 PROCEDURE — 99204 OFFICE O/P NEW MOD 45 MIN: CPT | Performed by: INTERNAL MEDICINE

## 2022-11-29 RX ORDER — ALBUTEROL SULFATE 90 UG/1
2 AEROSOL, METERED RESPIRATORY (INHALATION) EVERY 4 HOURS PRN
Qty: 18 G | Refills: 11 | Status: SHIPPED | OUTPATIENT
Start: 2022-11-29

## 2022-11-29 NOTE — PROGRESS NOTES
New Patient Pulmonary Office Visit      Patient Name: Smiley Sibley    Referring Physician: Brian Kulkarni MD    Chief Complaint:  No chief complaint on file.      History of Present Illness: Smiley Sibley is a 57 y.o. female who is here today to establish care with Pulmonary.  Patient has a past medical history significant for chronic diastolic heart failure, atrial flutter, hypertrophic obstructive cardiomyopathy with ICD in place, neurocardiogenic syncope, anxiety depression, and SVT.  He was referred to pulm for evaluation of shortness of breath.  Patient had shortness of breath the last couple years.  Mostly with exertion.  She has been seeing cardiology and has a history of hyper obstructive cardiomyopathy.  She has had history of arrhythmias and is on sotalol for this.  Denies any significant chest pain.  But is undergoing some cardiac work-up right now for possible coronary artery disease.  She is currently afebrile.  No other acute complaints.    Review of Systems:   Review of Systems   Constitutional: Negative for activity change, appetite change, chills and diaphoresis.   HENT: Negative for congestion, postnasal drip, sinus pressure and voice change.    Eyes: Negative for blurred vision.   Respiratory: Positive for shortness of breath. Negative for cough and wheezing.    Cardiovascular: Negative for chest pain.   Gastrointestinal: Negative for abdominal pain.   Musculoskeletal: Negative for myalgias.   Skin: Negative for color change and dry skin.   Allergic/Immunologic: Negative for environmental allergies.   Neurological: Negative for weakness and confusion.   Hematological: Negative for adenopathy.   Psychiatric/Behavioral: Negative for sleep disturbance and depressed mood.       Past Medical History:   Past Medical History:   Diagnosis Date   • Abnormal ECG 1998   • Anxiety and depression 10/21/2016   • Arrhythmia    • Asthma    • Congenital heart disease 1998   • Coronary artery disease     • Heart murmur 1966   • Hyperlipidemia 2018   • Hypertrophic obstructive cardiomyopathy (HCC) 10/21/2016   • Hypertrophic obstructive cardiomyopathy (HCC)    • Migraine headache 10/21/2016   • Mitral valve prolapse 1998   • Neurocardiogenic syncope 10/21/2016   • Nonsustained ventricular tachycardia 10/21/2016   • Sleep apnea 2013   • Valvular disease 1998       Past Surgical History:   Past Surgical History:   Procedure Laterality Date   • ABLATION OF DYSRHYTHMIC FOCUS  09/13/2018   • CARDIAC CATHETERIZATION  2010   • CARDIAC DEFIBRILLATOR PLACEMENT  07/29/2010   • CARDIAC ELECTROPHYSIOLOGY PROCEDURE N/A 9/13/2018    Procedure: Ablation SVT;  Surgeon: Maximino Nassar DO;  Location:  [facility];  Service: Cardiovascular   • CARDIAC ELECTROPHYSIOLOGY PROCEDURE N/A 7/20/2020    Procedure: ICD DC generator change-  3-4 weeks [facility];  Surgeon: Sanya Stiles MD;  Location:  [facility];  Service: Cardiology;  Laterality: N/A;   • COLONOSCOPY     • DILATATION AND CURETTAGE     • HYSTEROTOMY     • INSERT / REPLACE / REMOVE PACEMAKER  07/29/2010       Family History:   Family History   Problem Relation Age of Onset   • Arrhythmia Brother    • Heart disease Brother    • Arrhythmia Mother    • Heart disease Mother        Social History:   Social History     Socioeconomic History   • Marital status:    Tobacco Use   • Smoking status: Never   • Smokeless tobacco: Never   Substance and Sexual Activity   • Alcohol use: Yes     Alcohol/week: 1.0 - 2.0 standard drink     Types: 1 - 2 Glasses of wine per week     Comment: 3-4 times/year   • Drug use: No   • Sexual activity: Yes     Partners: Male     Birth control/protection: Surgical       Medications:     Current Outpatient Medications:   •  apixaban (ELIQUIS) 5 MG tablet tablet, Take 1 tablet by mouth 2 (Two) Times a Day., Disp: 60 tablet, Rfl: 3  •  sotalol (BETAPACE) 80 MG tablet, Take 1 tablet by mouth Every 12 (Twelve) Hours., Disp:  "60 tablet, Rfl: 5  •  albuterol sulfate  (90 Base) MCG/ACT inhaler, Inhale 2 puffs Every 4 (Four) Hours As Needed for Wheezing., Disp: 18 g, Rfl: 11    Allergies:   No Known Allergies    Physical Exam:  Vital Signs:   Vitals:    11/29/22 0901   BP: 110/78   BP Location: Right arm   Patient Position: Sitting   Cuff Size: Adult   Pulse: 55   Temp: 97.4 °F (36.3 °C)   TempSrc: Infrared   SpO2: 98%  Comment: room air  at rest   Weight: 74.2 kg (163 lb 9.6 oz)   Height: 165.1 cm (65\")       Physical Exam  Vitals and nursing note reviewed.   Constitutional:       General: She is not in acute distress.     Appearance: She is well-developed and normal weight. She is not ill-appearing or toxic-appearing.   HENT:      Head: Normocephalic and atraumatic.   Cardiovascular:      Rate and Rhythm: Normal rate and regular rhythm.      Pulses: Normal pulses.      Heart sounds: Normal heart sounds. No murmur heard.    No friction rub. No gallop.   Pulmonary:      Effort: Pulmonary effort is normal. No respiratory distress.      Breath sounds: Normal breath sounds. No wheezing, rhonchi or rales.   Musculoskeletal:      Right lower leg: No edema.      Left lower leg: No edema.   Skin:     General: Skin is warm and dry.   Neurological:      Mental Status: She is alert and oriented to person, place, and time.         Immunization History   Administered Date(s) Administered   • COVID-19 (MODERNA) 1st, 2nd, 3rd Dose Only 02/23/2021, 03/24/2021   • COVID-19 (MODERNA) BIVALENT BOOSTER 6+YRS 10/22/2022   • COVID-19 (MODERNA) BOOSTER 11/15/2021   • FluLaval/Fluzone >6mos 09/30/2020   • Flublok 18+yrs 11/15/2021, 10/22/2022   • Hepatitis A 09/30/2019   • Pneumococcal Conjugate 13-Valent (PCV13) 09/19/2016       Results Review:   - I personally reviewed the pts imaging from chest x-ray on 11/29/2022 shows mild cardiac enlargement, with no acute cardiopulmonary findings.  - I personally reviewed the pts PFT from 11/8/2022 showed mild " obstruction without restriction and normal DLCO, no significant bronchodilator response was seen.  - I personally viewed the patient's pulmonary function testing from 11/29/2020 which showed no obstruction or restriction with a normal DLCO.  - I personally reviewed the pts Echo report from 6/11/2021 showed a EF of 50%, left ventricular wall thickness consistent with moderate to severe septal asymmetric hypertrophy, left ventricular mass is increased, findings consistent with nonobstructive hypertrophic cardiomyopathy, diastolic heart failure, right atrial cavity moderately dilated, left atrial cavity moderately dilated, RVSP elevated at 55 mmHg.  - I personally reviewed the pts chart with regards to evaluation by cardiology work-up for shortness of breath.    Assessment / Plan:   1. SOB (shortness of breath) (Primary)  2. Mild intermittent asthma without complication  -PFTs are most consistent with underlying asthma although not sure that this is causing her symptoms.  Her heart disease is an issue, her echo shows signs of diastolic heart failure, RVSP is elevated at 55 mmHg.  I suspect that if she had pulmonary hypertension that would be WHO group 2 and the treatment would be to treat the underlying heart disease, which she is currently being optimized on.  I am going to start her on albuterol 2 puffs every 4 hours as needed.  Instructed her to use this 15 minutes prior to exercise initially.  And then if she continues to have problems and her CT angiogram work-up is negative then we can consider escalating her medications over time.  Did inform her of the side effects to beta agonist are tachycardia and anxiousness.    3. Hypertrophic nonobstructive cardiomyopathy (HCC)  -Possible this is playing a role in the patient's shortness of breath and I agree with work-up by cardiology for looking for underlying coronary disease with CT angiogram.  I informed her I would recommend she keeps all his  appointments.      Follow Up:   Return in about 4 months (around 3/29/2023).     RULA Thompson,   Pulmonary and Critical Care Medicine  Note Electronically Signed    Part of this note may be an electronic transcription/translation of spoken language to printed text using the Dragon Dictation System.

## 2022-12-07 PROCEDURE — 93296 REM INTERROG EVL PM/IDS: CPT | Performed by: STUDENT IN AN ORGANIZED HEALTH CARE EDUCATION/TRAINING PROGRAM

## 2022-12-07 PROCEDURE — 93295 DEV INTERROG REMOTE 1/2/MLT: CPT | Performed by: STUDENT IN AN ORGANIZED HEALTH CARE EDUCATION/TRAINING PROGRAM

## 2022-12-14 LAB
ERYTHROCYTE [DISTWIDTH] IN BLOOD BY AUTOMATED COUNT: 12.5 % (ref 11.7–15.4)
HCT VFR BLD AUTO: 48.1 % (ref 34–46.6)
HGB BLD-MCNC: 16.1 G/DL (ref 11.1–15.9)
MCH RBC QN AUTO: 30.8 PG (ref 26.6–33)
MCHC RBC AUTO-ENTMCNC: 33.5 G/DL (ref 31.5–35.7)
MCV RBC AUTO: 92 FL (ref 79–97)
PLATELET # BLD AUTO: 335 X10E3/UL (ref 150–450)
RBC # BLD AUTO: 5.22 X10E6/UL (ref 3.77–5.28)
WBC # BLD AUTO: 8 X10E3/UL (ref 3.4–10.8)

## 2022-12-28 RX ORDER — SOTALOL HYDROCHLORIDE 80 MG/1
80 TABLET ORAL EVERY 12 HOURS SCHEDULED
Qty: 60 TABLET | Refills: 6 | Status: SHIPPED | OUTPATIENT
Start: 2022-12-28

## 2022-12-29 RX ORDER — METOPROLOL TARTRATE 50 MG/1
50 TABLET, FILM COATED ORAL ONCE AS NEEDED
Status: CANCELLED | OUTPATIENT
Start: 2022-12-29

## 2022-12-29 RX ORDER — NITROGLYCERIN 0.4 MG/1
0.4 TABLET SUBLINGUAL
Status: CANCELLED | OUTPATIENT
Start: 2022-12-29 | End: 2022-12-29

## 2022-12-29 RX ORDER — METOPROLOL TARTRATE 50 MG/1
100 TABLET, FILM COATED ORAL ONCE AS NEEDED
Status: CANCELLED | OUTPATIENT
Start: 2022-12-29

## 2022-12-29 RX ORDER — LIDOCAINE HYDROCHLORIDE 10 MG/ML
5 INJECTION, SOLUTION EPIDURAL; INFILTRATION; INTRACAUDAL; PERINEURAL AS NEEDED
Status: CANCELLED | OUTPATIENT
Start: 2022-12-29

## 2022-12-29 RX ORDER — SODIUM CHLORIDE 0.9 % (FLUSH) 0.9 %
10 SYRINGE (ML) INJECTION AS NEEDED
Status: CANCELLED | OUTPATIENT
Start: 2022-12-29

## 2022-12-29 RX ORDER — SODIUM CHLORIDE 0.9 % (FLUSH) 0.9 %
3 SYRINGE (ML) INJECTION EVERY 12 HOURS SCHEDULED
Status: CANCELLED | OUTPATIENT
Start: 2022-12-29

## 2022-12-29 NOTE — DISCHARGE INSTRUCTIONS
MAKE SURE TO DRINK PLENTY OF FLUIDS OF FOR THE NEXT 48 HOURS TO FLUSH THE CONTRAST DYE THROUGH YOUR KIDNEYS TO PROTECT RENAL FUNCTION.

## 2022-12-30 ENCOUNTER — HOSPITAL ENCOUNTER (OUTPATIENT)
Dept: CT IMAGING | Facility: HOSPITAL | Age: 57
Discharge: HOME | End: 2022-12-30
Payer: COMMERCIAL

## 2023-01-19 ENCOUNTER — TELEPHONE (OUTPATIENT)
Dept: CARDIOLOGY | Facility: CLINIC | Age: 58
End: 2023-01-19
Payer: COMMERCIAL

## 2023-01-19 NOTE — TELEPHONE ENCOUNTER
Left a message on Ms. Sibley's phone letting her know that we didn't receive the scheduled reading from her beside home monitor. I asked her if she would send in a manual reading or to give me a call back for assistance.

## 2023-02-06 RX ORDER — APIXABAN 5 MG/1
TABLET, FILM COATED ORAL
Qty: 60 TABLET | Refills: 6 | Status: SHIPPED | OUTPATIENT
Start: 2023-02-06

## 2023-03-08 PROCEDURE — 93295 DEV INTERROG REMOTE 1/2/MLT: CPT | Performed by: STUDENT IN AN ORGANIZED HEALTH CARE EDUCATION/TRAINING PROGRAM

## 2023-03-08 PROCEDURE — 93296 REM INTERROG EVL PM/IDS: CPT | Performed by: STUDENT IN AN ORGANIZED HEALTH CARE EDUCATION/TRAINING PROGRAM

## 2023-03-15 ENCOUNTER — HOSPITAL ENCOUNTER (OUTPATIENT)
Dept: CT IMAGING | Facility: HOSPITAL | Age: 58
Discharge: HOME | End: 2023-03-15
Admitting: INTERNAL MEDICINE
Payer: COMMERCIAL

## 2023-03-15 VITALS
TEMPERATURE: 96.2 F | SYSTOLIC BLOOD PRESSURE: 99 MMHG | BODY MASS INDEX: 25.33 KG/M2 | WEIGHT: 152 LBS | RESPIRATION RATE: 16 BRPM | HEIGHT: 65 IN | DIASTOLIC BLOOD PRESSURE: 56 MMHG | OXYGEN SATURATION: 98 % | HEART RATE: 60 BPM

## 2023-03-15 DIAGNOSIS — I42.2 HYPERTROPHIC NONOBSTRUCTIVE CARDIOMYOPATHY: ICD-10-CM

## 2023-03-15 DIAGNOSIS — R06.02 SOB (SHORTNESS OF BREATH): ICD-10-CM

## 2023-03-15 PROCEDURE — 82565 ASSAY OF CREATININE: CPT

## 2023-03-15 PROCEDURE — 75574 CT ANGIO HRT W/3D IMAGE: CPT

## 2023-03-15 PROCEDURE — 25510000001 IOPAMIDOL PER 1 ML: Performed by: INTERNAL MEDICINE

## 2023-03-15 RX ORDER — DAPAGLIFLOZIN 10 MG/1
10 TABLET, FILM COATED ORAL DAILY
COMMUNITY

## 2023-03-15 RX ORDER — NITROGLYCERIN 0.4 MG/1
TABLET SUBLINGUAL
Status: DISPENSED
Start: 2023-03-15 | End: 2023-03-15

## 2023-03-15 RX ORDER — SODIUM CHLORIDE 0.9 % (FLUSH) 0.9 %
10 SYRINGE (ML) INJECTION AS NEEDED
Status: DISCONTINUED | OUTPATIENT
Start: 2023-03-15 | End: 2023-03-16 | Stop reason: HOSPADM

## 2023-03-15 RX ORDER — METOPROLOL TARTRATE 50 MG/1
100 TABLET, FILM COATED ORAL ONCE AS NEEDED
Status: DISCONTINUED | OUTPATIENT
Start: 2023-03-15 | End: 2023-03-16 | Stop reason: HOSPADM

## 2023-03-15 RX ORDER — METOPROLOL TARTRATE 50 MG/1
50 TABLET, FILM COATED ORAL ONCE AS NEEDED
Status: DISCONTINUED | OUTPATIENT
Start: 2023-03-15 | End: 2023-03-16 | Stop reason: HOSPADM

## 2023-03-15 RX ORDER — NITROGLYCERIN 0.4 MG/1
0.4 TABLET SUBLINGUAL
Status: COMPLETED | OUTPATIENT
Start: 2023-03-15 | End: 2023-03-15

## 2023-03-15 RX ADMIN — NITROGLYCERIN 0.4 MG: 0.4 TABLET SUBLINGUAL at 10:08

## 2023-03-15 RX ADMIN — IOPAMIDOL 65 ML: 755 INJECTION, SOLUTION INTRAVENOUS at 10:13

## 2023-03-20 ENCOUNTER — TELEPHONE (OUTPATIENT)
Dept: CARDIOLOGY | Facility: CLINIC | Age: 58
End: 2023-03-20
Payer: COMMERCIAL

## 2023-03-20 NOTE — TELEPHONE ENCOUNTER
Dr. Amezquita called to notify [facility] that pts CT Angio shows infiltrating mediastinal adenopathy and multiple left lung nodules suspicious of cancer. Notified [facility] and he verbalized understanding and will read report once available.

## 2023-03-21 ENCOUNTER — TELEPHONE (OUTPATIENT)
Dept: CARDIOLOGY | Facility: CLINIC | Age: 58
End: 2023-03-21
Payer: COMMERCIAL

## 2023-03-21 NOTE — TELEPHONE ENCOUNTER
I called the patient to discuss her coronary CTA results.  Her coronaries were normal, calcium score of 0, I discussed this with her.  However the scan did show two pleural-based left lung nodules with hilar and mediastinal lymphadenopathy.  Dr. Amezquita (radiologist) was concerned for neoplasm.  I reached out to Dr. Thompson (Pulmonologist) who she saw previously for shortness of breath.  He stated he will get a clinic visit scheduled for her soon to discuss findings and work-up.  She voiced understanding of these findings and recommendations and all questions were answered.

## 2023-03-22 ENCOUNTER — PREP FOR SURGERY (OUTPATIENT)
Dept: OTHER | Facility: HOSPITAL | Age: 58
End: 2023-03-22
Payer: COMMERCIAL

## 2023-03-22 ENCOUNTER — OFFICE VISIT (OUTPATIENT)
Dept: PULMONOLOGY | Facility: CLINIC | Age: 58
End: 2023-03-22
Payer: COMMERCIAL

## 2023-03-22 VITALS
DIASTOLIC BLOOD PRESSURE: 82 MMHG | OXYGEN SATURATION: 100 % | TEMPERATURE: 97.7 F | HEART RATE: 83 BPM | HEIGHT: 65 IN | WEIGHT: 152.6 LBS | BODY MASS INDEX: 25.43 KG/M2 | SYSTOLIC BLOOD PRESSURE: 122 MMHG

## 2023-03-22 DIAGNOSIS — R59.0 MEDIASTINAL ADENOPATHY: ICD-10-CM

## 2023-03-22 DIAGNOSIS — I48.92 ATRIAL FLUTTER, UNSPECIFIED TYPE: ICD-10-CM

## 2023-03-22 DIAGNOSIS — R91.1 NODULE OF UPPER LOBE OF LEFT LUNG: ICD-10-CM

## 2023-03-22 DIAGNOSIS — J45.20 MILD INTERMITTENT ASTHMA WITHOUT COMPLICATION: ICD-10-CM

## 2023-03-22 DIAGNOSIS — I42.2 HYPERTROPHIC NONOBSTRUCTIVE CARDIOMYOPATHY: ICD-10-CM

## 2023-03-22 DIAGNOSIS — R06.02 SOB (SHORTNESS OF BREATH): Primary | ICD-10-CM

## 2023-03-22 PROCEDURE — 99214 OFFICE O/P EST MOD 30 MIN: CPT | Performed by: INTERNAL MEDICINE

## 2023-03-22 RX ORDER — SODIUM CHLORIDE 0.9 % (FLUSH) 0.9 %
10 SYRINGE (ML) INJECTION AS NEEDED
Status: CANCELLED | OUTPATIENT
Start: 2023-03-22

## 2023-03-22 RX ORDER — SODIUM CHLORIDE 9 MG/ML
40 INJECTION, SOLUTION INTRAVENOUS AS NEEDED
Status: CANCELLED | OUTPATIENT
Start: 2023-03-22

## 2023-03-22 RX ORDER — SODIUM CHLORIDE 9 MG/ML
125 INJECTION, SOLUTION INTRAVENOUS CONTINUOUS
Status: CANCELLED | OUTPATIENT
Start: 2023-03-22

## 2023-03-22 RX ORDER — LIDOCAINE HYDROCHLORIDE 40 MG/ML
4 INJECTION, SOLUTION RETROBULBAR; TOPICAL ONCE
Status: CANCELLED | OUTPATIENT
Start: 2023-03-22 | End: 2023-03-22

## 2023-03-22 RX ORDER — SODIUM CHLORIDE 0.9 % (FLUSH) 0.9 %
3 SYRINGE (ML) INJECTION EVERY 12 HOURS SCHEDULED
Status: CANCELLED | OUTPATIENT
Start: 2023-03-22

## 2023-03-22 NOTE — PROGRESS NOTES
"Follow Up Office Note       Patient Name: Smiley Sibley    Referring Physician: No ref. provider found    Chief Complaint:    Chief Complaint   Patient presents with   • Shortness of Breath   • Follow-up       History of Present Illness: Smiley Sibley is a 57 y.o. female who is here today to follow-up care with Pulmonary.  Patient has a past medical history significant for chronic diastolic heart failure, atrial flutter, hypertrophic obstructive cardiomyopathy with ICD in place, neurocardiogenic syncope, anxiety depression, and SVT.  Patient was undergoing work-up for shortness of breath through cardiology when the had done a CT of the chest which showed significant mediastinal hilar lymphadenopathy with a 1.3 cm left upper lobe nodule concerning for underlying cancer.  She currently has her breathing is better than the first time I saw her.  She denies any chest pain, nausea, fever, or chills.  She is currently on Eliquis due to her atrial fibrillation.  No other acute complaints.    Review of Systems:   Review of Systems   Constitutional: Negative for chills, fatigue and fever.   HENT: Negative for congestion and voice change.    Eyes: Negative for blurred vision.   Respiratory: Negative for cough, shortness of breath and wheezing.    Cardiovascular: Negative for chest pain.   Skin: Negative for dry skin.   Hematological: Negative for adenopathy.   Psychiatric/Behavioral: Negative for agitation and depressed mood.       The following portions of the patient's history were reviewed and updated as appropriate: allergies, current medications, past family history, past medical history, past social history, past surgical history and problem list.    Physical Exam:  Vital Signs:   Vitals:    03/22/23 1442   BP: 122/82   Pulse: 83   Temp: 97.7 °F (36.5 °C)   SpO2: 100%  Comment: resting, room air   Weight: 69.2 kg (152 lb 9.6 oz)   Height: 165.1 cm (65\")       Physical Exam  Vitals and nursing note reviewed. "   Constitutional:       General: She is not in acute distress.     Appearance: She is well-developed and normal weight. She is not ill-appearing or toxic-appearing.   HENT:      Head: Normocephalic and atraumatic.   Cardiovascular:      Rate and Rhythm: Normal rate and regular rhythm.      Pulses: Normal pulses.      Heart sounds: Normal heart sounds. No murmur heard.    No friction rub. No gallop.   Pulmonary:      Effort: Pulmonary effort is normal. No respiratory distress.      Breath sounds: Normal breath sounds. No wheezing, rhonchi or rales.   Musculoskeletal:      Right lower leg: No edema.      Left lower leg: No edema.   Skin:     General: Skin is warm and dry.   Neurological:      Mental Status: She is alert and oriented to person, place, and time.         Immunization History   Administered Date(s) Administered   • COVID-19 (MODERNA) 1st, 2nd, 3rd Dose Only 02/23/2021, 03/24/2021   • COVID-19 (MODERNA) BIVALENT BOOSTER 12+YRS 10/22/2022   • COVID-19 (MODERNA) BOOSTER 11/15/2021   • FluLaval/Fluzone >6mos 09/30/2020   • Flublok 18+yrs 11/15/2021, 10/22/2022   • Hepatitis A 09/30/2019   • Pneumococcal Conjugate 13-Valent (PCV13) 09/19/2016       Results Review:   -I personally viewed the patient's CT of the chest from 3/15/2023, that shows significant mediastinal and hilar lymphadenopathy, in addition to that there is a left upper lobe noncalcified nodule measuring 1.2 cm.   - chest x-ray on 11/29/2022 shows mild cardiac enlargement, with no acute cardiopulmonary findings.  - PFT from 11/8/2022 showed mild obstruction without restriction and normal DLCO, no significant bronchodilator response was seen.  - pulmonary function testing from 11/29/2020 which showed no obstruction or restriction with a normal DLCO.  - Echo report from 6/11/2021 showed a EF of 50%, left ventricular wall thickness consistent with moderate to severe septal asymmetric hypertrophy, left ventricular mass is increased, findings  consistent with nonobstructive hypertrophic cardiomyopathy, diastolic heart failure, right atrial cavity moderately dilated, left atrial cavity moderately dilated, RVSP elevated at 55 mmHg.    Assessment / Plan:   Diagnoses and all orders for this visit:    1. SOB (shortness of breath) (Primary)  2. Mediastinal adenopathy  3. Nodule of upper lobe of left lung  -With regards to the mediastinal adenopathy, there is concern for this being underlying malignancy but also sarcoidosis would be another viable option or potentially enlarged lymph nodes from her chronic heart failure.  Regardless given the size and the concern for cancer I think we should pursue biopsy.  I explained to the patient that I would recommend that we initially go with an EBUS given the size of the lymph nodes and hold on trying to biopsy the left upper lobe nodule.  The location of that could be difficult to get to especially with our current super D system.  But may be much more of a viable option of the robotic system.  I will ultimately do an EBUS planning next week I discussed the risk and benefits with the patient she verbalized understanding of this.  She is on Eliquis was asked her to hold 72 hours prior to the procedure.  I will send it for cytology, flow, and culture.  I did explain to the patient that if everything is negative then we may have to go back at some point and biopsy the lung nodule itself, and the patient verbalized understanding.    4. Atrial flutter, unspecified type (HCC)  -Patient should continue on her sotalol but hold her Eliquis 72 hours prior to the procedure.    5. Hypertrophic nonobstructive cardiomyopathy (HCC)  -Continue to follow with cardiology, defer management to them    6. Mild intermittent asthma without complication  -Continue albuterol on a as needed basis.      Follow Up:   Return in about 4 weeks (around 4/19/2023) for EBUS.       RULA Thompson, DO  Pulmonary and Critical Care Medicine  Note  Electronically Signed    Part of this note may be an electronic transcription/translation of spoken language to printed text using the Dragon Dictation System.

## 2023-03-28 ENCOUNTER — PRE-ADMISSION TESTING (OUTPATIENT)
Dept: PREADMISSION TESTING | Facility: HOSPITAL | Age: 58
End: 2023-03-28
Payer: COMMERCIAL

## 2023-03-28 VITALS — BODY MASS INDEX: 25.23 KG/M2 | WEIGHT: 151.46 LBS | HEIGHT: 65 IN

## 2023-03-28 DIAGNOSIS — R06.02 SOB (SHORTNESS OF BREATH): ICD-10-CM

## 2023-03-28 LAB
ALBUMIN SERPL-MCNC: 4.5 G/DL (ref 3.5–5.2)
ALBUMIN/GLOB SERPL: 1.7 G/DL
ALP SERPL-CCNC: 120 U/L (ref 39–117)
ALT SERPL W P-5'-P-CCNC: 27 U/L (ref 1–33)
ANION GAP SERPL CALCULATED.3IONS-SCNC: 10 MMOL/L (ref 5–15)
APTT PPP: 34.5 SECONDS (ref 22–39)
AST SERPL-CCNC: 35 U/L (ref 1–32)
BASOPHILS # BLD AUTO: 0.04 10*3/MM3 (ref 0–0.2)
BASOPHILS NFR BLD AUTO: 0.5 % (ref 0–1.5)
BILIRUB SERPL-MCNC: 0.5 MG/DL (ref 0–1.2)
BUN SERPL-MCNC: 20 MG/DL (ref 6–20)
BUN/CREAT SERPL: 20.4 (ref 7–25)
CALCIUM SPEC-SCNC: 10 MG/DL (ref 8.6–10.5)
CHLORIDE SERPL-SCNC: 102 MMOL/L (ref 98–107)
CO2 SERPL-SCNC: 27 MMOL/L (ref 22–29)
CREAT SERPL-MCNC: 0.98 MG/DL (ref 0.57–1)
DEPRECATED RDW RBC AUTO: 46 FL (ref 37–54)
EGFRCR SERPLBLD CKD-EPI 2021: 67.9 ML/MIN/1.73
EOSINOPHIL # BLD AUTO: 0.13 10*3/MM3 (ref 0–0.4)
EOSINOPHIL NFR BLD AUTO: 1.5 % (ref 0.3–6.2)
ERYTHROCYTE [DISTWIDTH] IN BLOOD BY AUTOMATED COUNT: 13 % (ref 12.3–15.4)
GLOBULIN UR ELPH-MCNC: 2.6 GM/DL
GLUCOSE SERPL-MCNC: 99 MG/DL (ref 65–99)
HCT VFR BLD AUTO: 47.1 % (ref 34–46.6)
HGB BLD-MCNC: 15 G/DL (ref 12–15.9)
IMM GRANULOCYTES # BLD AUTO: 0.02 10*3/MM3 (ref 0–0.05)
IMM GRANULOCYTES NFR BLD AUTO: 0.2 % (ref 0–0.5)
INR PPP: 1.11 (ref 0.84–1.13)
LYMPHOCYTES # BLD AUTO: 2.01 10*3/MM3 (ref 0.7–3.1)
LYMPHOCYTES NFR BLD AUTO: 23.4 % (ref 19.6–45.3)
MCH RBC QN AUTO: 30.5 PG (ref 26.6–33)
MCHC RBC AUTO-ENTMCNC: 31.8 G/DL (ref 31.5–35.7)
MCV RBC AUTO: 95.7 FL (ref 79–97)
MONOCYTES # BLD AUTO: 0.9 10*3/MM3 (ref 0.1–0.9)
MONOCYTES NFR BLD AUTO: 10.5 % (ref 5–12)
NEUTROPHILS NFR BLD AUTO: 5.5 10*3/MM3 (ref 1.7–7)
NEUTROPHILS NFR BLD AUTO: 63.9 % (ref 42.7–76)
NRBC BLD AUTO-RTO: 0 /100 WBC (ref 0–0.2)
PLATELET # BLD AUTO: 299 10*3/MM3 (ref 140–450)
PMV BLD AUTO: 10 FL (ref 6–12)
POTASSIUM SERPL-SCNC: 4.4 MMOL/L (ref 3.5–5.2)
PROT SERPL-MCNC: 7.1 G/DL (ref 6–8.5)
PROTHROMBIN TIME: 14.3 SECONDS (ref 11.4–14.4)
RBC # BLD AUTO: 4.92 10*6/MM3 (ref 3.77–5.28)
SODIUM SERPL-SCNC: 139 MMOL/L (ref 136–145)
WBC NRBC COR # BLD: 8.6 10*3/MM3 (ref 3.4–10.8)

## 2023-03-28 PROCEDURE — 36415 COLL VENOUS BLD VENIPUNCTURE: CPT

## 2023-03-28 PROCEDURE — 80053 COMPREHEN METABOLIC PANEL: CPT

## 2023-03-28 PROCEDURE — 85025 COMPLETE CBC W/AUTO DIFF WBC: CPT

## 2023-03-28 PROCEDURE — 85730 THROMBOPLASTIN TIME PARTIAL: CPT

## 2023-03-28 PROCEDURE — 85610 PROTHROMBIN TIME: CPT

## 2023-03-30 ENCOUNTER — ANESTHESIA (OUTPATIENT)
Dept: GASTROENTEROLOGY | Facility: HOSPITAL | Age: 58
End: 2023-03-30
Payer: COMMERCIAL

## 2023-03-30 ENCOUNTER — HOSPITAL ENCOUNTER (OUTPATIENT)
Facility: HOSPITAL | Age: 58
Setting detail: HOSPITAL OUTPATIENT SURGERY
Discharge: HOME | End: 2023-03-30
Attending: INTERNAL MEDICINE | Admitting: INTERNAL MEDICINE
Payer: COMMERCIAL

## 2023-03-30 ENCOUNTER — ANESTHESIA EVENT (OUTPATIENT)
Dept: GASTROENTEROLOGY | Facility: HOSPITAL | Age: 58
End: 2023-03-30
Payer: COMMERCIAL

## 2023-03-30 VITALS
TEMPERATURE: 97 F | HEART RATE: 60 BPM | OXYGEN SATURATION: 94 % | RESPIRATION RATE: 14 BRPM | SYSTOLIC BLOOD PRESSURE: 105 MMHG | DIASTOLIC BLOOD PRESSURE: 66 MMHG

## 2023-03-30 DIAGNOSIS — R06.02 SOB (SHORTNESS OF BREATH): ICD-10-CM

## 2023-03-30 DIAGNOSIS — R59.1 LYMPHADENOPATHY: ICD-10-CM

## 2023-03-30 DIAGNOSIS — R59.0 MEDIASTINAL ADENOPATHY: ICD-10-CM

## 2023-03-30 PROCEDURE — C1726 CATH, BAL DIL, NON-VASCULAR: HCPCS | Performed by: INTERNAL MEDICINE

## 2023-03-30 PROCEDURE — 87206 SMEAR FLUORESCENT/ACID STAI: CPT | Performed by: INTERNAL MEDICINE

## 2023-03-30 PROCEDURE — 87116 MYCOBACTERIA CULTURE: CPT | Performed by: INTERNAL MEDICINE

## 2023-03-30 PROCEDURE — 87070 CULTURE OTHR SPECIMN AEROBIC: CPT | Performed by: INTERNAL MEDICINE

## 2023-03-30 PROCEDURE — 87102 FUNGUS ISOLATION CULTURE: CPT | Performed by: INTERNAL MEDICINE

## 2023-03-30 PROCEDURE — 31624 DX BRONCHOSCOPE/LAVAGE: CPT | Performed by: INTERNAL MEDICINE

## 2023-03-30 PROCEDURE — 87205 SMEAR GRAM STAIN: CPT | Performed by: INTERNAL MEDICINE

## 2023-03-30 PROCEDURE — 87176 TISSUE HOMOGENIZATION CULTR: CPT | Performed by: INTERNAL MEDICINE

## 2023-03-30 PROCEDURE — 25010000002 PHENYLEPHRINE 10 MG/ML SOLUTION

## 2023-03-30 PROCEDURE — 25010000002 PROPOFOL 10 MG/ML EMULSION

## 2023-03-30 PROCEDURE — 25010000002 DEXAMETHASONE PER 1 MG

## 2023-03-30 PROCEDURE — 25010000002 FENTANYL CITRATE (PF) 100 MCG/2ML SOLUTION

## 2023-03-30 PROCEDURE — 31653 BRONCH EBUS SAMPLNG 3/> NODE: CPT | Performed by: INTERNAL MEDICINE

## 2023-03-30 RX ORDER — ROCURONIUM BROMIDE 10 MG/ML
INJECTION, SOLUTION INTRAVENOUS AS NEEDED
Status: DISCONTINUED | OUTPATIENT
Start: 2023-03-30 | End: 2023-03-30 | Stop reason: SURG

## 2023-03-30 RX ORDER — PROPOFOL 10 MG/ML
VIAL (ML) INTRAVENOUS AS NEEDED
Status: DISCONTINUED | OUTPATIENT
Start: 2023-03-30 | End: 2023-03-30 | Stop reason: SURG

## 2023-03-30 RX ORDER — SODIUM CHLORIDE 9 MG/ML
40 INJECTION, SOLUTION INTRAVENOUS AS NEEDED
Status: DISCONTINUED | OUTPATIENT
Start: 2023-03-30 | End: 2023-03-30 | Stop reason: HOSPADM

## 2023-03-30 RX ORDER — FAMOTIDINE 20 MG/1
20 TABLET, FILM COATED ORAL ONCE
Status: DISCONTINUED | OUTPATIENT
Start: 2023-03-30 | End: 2023-03-30 | Stop reason: HOSPADM

## 2023-03-30 RX ORDER — LIDOCAINE HYDROCHLORIDE 10 MG/ML
0.5 INJECTION, SOLUTION EPIDURAL; INFILTRATION; INTRACAUDAL; PERINEURAL ONCE AS NEEDED
Status: DISCONTINUED | OUTPATIENT
Start: 2023-03-30 | End: 2023-03-30 | Stop reason: HOSPADM

## 2023-03-30 RX ORDER — LIDOCAINE HYDROCHLORIDE 10 MG/ML
INJECTION, SOLUTION EPIDURAL; INFILTRATION; INTRACAUDAL; PERINEURAL AS NEEDED
Status: DISCONTINUED | OUTPATIENT
Start: 2023-03-30 | End: 2023-03-30 | Stop reason: SURG

## 2023-03-30 RX ORDER — IPRATROPIUM BROMIDE AND ALBUTEROL SULFATE 2.5; .5 MG/3ML; MG/3ML
3 SOLUTION RESPIRATORY (INHALATION) ONCE AS NEEDED
Status: DISCONTINUED | OUTPATIENT
Start: 2023-03-30 | End: 2023-03-30 | Stop reason: HOSPADM

## 2023-03-30 RX ORDER — MIDAZOLAM HYDROCHLORIDE 1 MG/ML
1 INJECTION INTRAMUSCULAR; INTRAVENOUS
Status: DISCONTINUED | OUTPATIENT
Start: 2023-03-30 | End: 2023-03-30 | Stop reason: HOSPADM

## 2023-03-30 RX ORDER — PHENYLEPHRINE HYDROCHLORIDE 10 MG/ML
INJECTION INTRAVENOUS AS NEEDED
Status: DISCONTINUED | OUTPATIENT
Start: 2023-03-30 | End: 2023-03-30 | Stop reason: SURG

## 2023-03-30 RX ORDER — DEXAMETHASONE SODIUM PHOSPHATE 4 MG/ML
INJECTION, SOLUTION INTRA-ARTICULAR; INTRALESIONAL; INTRAMUSCULAR; INTRAVENOUS; SOFT TISSUE AS NEEDED
Status: DISCONTINUED | OUTPATIENT
Start: 2023-03-30 | End: 2023-03-30 | Stop reason: SURG

## 2023-03-30 RX ORDER — ONDANSETRON 2 MG/ML
4 INJECTION INTRAMUSCULAR; INTRAVENOUS ONCE AS NEEDED
Status: DISCONTINUED | OUTPATIENT
Start: 2023-03-30 | End: 2023-03-30 | Stop reason: HOSPADM

## 2023-03-30 RX ORDER — SODIUM CHLORIDE, SODIUM LACTATE, POTASSIUM CHLORIDE, CALCIUM CHLORIDE 600; 310; 30; 20 MG/100ML; MG/100ML; MG/100ML; MG/100ML
9 INJECTION, SOLUTION INTRAVENOUS CONTINUOUS
Status: DISCONTINUED | OUTPATIENT
Start: 2023-03-30 | End: 2023-03-30 | Stop reason: HOSPADM

## 2023-03-30 RX ORDER — FAMOTIDINE 10 MG/ML
20 INJECTION, SOLUTION INTRAVENOUS ONCE
Status: COMPLETED | OUTPATIENT
Start: 2023-03-30 | End: 2023-03-30

## 2023-03-30 RX ORDER — SODIUM CHLORIDE 0.9 % (FLUSH) 0.9 %
10 SYRINGE (ML) INJECTION AS NEEDED
Status: DISCONTINUED | OUTPATIENT
Start: 2023-03-30 | End: 2023-03-30 | Stop reason: HOSPADM

## 2023-03-30 RX ORDER — SODIUM CHLORIDE 0.9 % (FLUSH) 0.9 %
10 SYRINGE (ML) INJECTION EVERY 12 HOURS SCHEDULED
Status: DISCONTINUED | OUTPATIENT
Start: 2023-03-30 | End: 2023-03-30 | Stop reason: HOSPADM

## 2023-03-30 RX ORDER — FENTANYL CITRATE 50 UG/ML
INJECTION, SOLUTION INTRAMUSCULAR; INTRAVENOUS AS NEEDED
Status: DISCONTINUED | OUTPATIENT
Start: 2023-03-30 | End: 2023-03-30 | Stop reason: SURG

## 2023-03-30 RX ADMIN — FENTANYL CITRATE 50 MCG: 50 INJECTION, SOLUTION INTRAMUSCULAR; INTRAVENOUS at 12:20

## 2023-03-30 RX ADMIN — PROPOFOL 100 MG: 10 INJECTION, EMULSION INTRAVENOUS at 12:19

## 2023-03-30 RX ADMIN — PHENYLEPHRINE HYDROCHLORIDE 100 MCG: 10 INJECTION INTRAVENOUS at 12:19

## 2023-03-30 RX ADMIN — PROPOFOL 25 MCG/KG/MIN: 10 INJECTION, EMULSION INTRAVENOUS at 12:23

## 2023-03-30 RX ADMIN — SUGAMMADEX 200 MG: 100 INJECTION, SOLUTION INTRAVENOUS at 13:05

## 2023-03-30 RX ADMIN — PROPOFOL 50 MG: 10 INJECTION, EMULSION INTRAVENOUS at 12:20

## 2023-03-30 RX ADMIN — ROCURONIUM BROMIDE 50 MG: 10 INJECTION INTRAVENOUS at 12:19

## 2023-03-30 RX ADMIN — FAMOTIDINE 20 MG: 10 INJECTION INTRAVENOUS at 11:12

## 2023-03-30 RX ADMIN — FENTANYL CITRATE 50 MCG: 50 INJECTION, SOLUTION INTRAMUSCULAR; INTRAVENOUS at 12:33

## 2023-03-30 RX ADMIN — LIDOCAINE HYDROCHLORIDE 100 MG: 10 INJECTION, SOLUTION EPIDURAL; INFILTRATION; INTRACAUDAL; PERINEURAL at 12:19

## 2023-03-30 RX ADMIN — DEXAMETHASONE SODIUM PHOSPHATE 4 MG: 4 INJECTION, SOLUTION INTRAMUSCULAR; INTRAVENOUS at 12:33

## 2023-03-30 RX ADMIN — SODIUM CHLORIDE, POTASSIUM CHLORIDE, SODIUM LACTATE AND CALCIUM CHLORIDE: 600; 310; 30; 20 INJECTION, SOLUTION INTRAVENOUS at 12:15

## 2023-03-30 RX ADMIN — SODIUM CHLORIDE 40 ML: 9 INJECTION, SOLUTION INTRAVENOUS at 11:12

## 2023-03-30 RX ADMIN — SODIUM CHLORIDE, POTASSIUM CHLORIDE, SODIUM LACTATE AND CALCIUM CHLORIDE: 600; 310; 30; 20 INJECTION, SOLUTION INTRAVENOUS at 12:45

## 2023-03-30 NOTE — ANESTHESIA PREPROCEDURE EVALUATION
Anesthesia Evaluation     Patient summary reviewed and Nursing notes reviewed   no history of anesthetic complications:  NPO Solid Status: > 8 hours  NPO Liquid Status: > 8 hours           Airway   Mallampati: II  TM distance: >3 FB  Neck ROM: full  No difficulty expected  Dental      Pulmonary - normal exam   (+) asthma,shortness of breath, sleep apnea,   Cardiovascular - normal exam    (+) valvular problems/murmurs, CAD, dysrhythmias, hyperlipidemia,       Neuro/Psych  (+) headaches, syncope, psychiatric history,    GI/Hepatic/Renal/Endo      Musculoskeletal     Abdominal    Substance History      OB/GYN          Other                        Anesthesia Plan    ASA 3     general     intravenous induction     Anesthetic plan, risks, benefits, and alternatives have been provided, discussed and informed consent has been obtained with: patient.        CODE STATUS:

## 2023-03-30 NOTE — ANESTHESIA POSTPROCEDURE EVALUATION
Patient: Smiley Sibley    Procedure Summary     Date: 03/30/23 Room / Location:  [facility]    Anesthesia Start: 1215 Anesthesia Stop: 1317    Procedure: BRONCHOSCOPY WITH ENDOBRONCHIAL ULTRASOUND ([place]) Diagnosis:       SOB (shortness of breath)      (SOB (shortness of breath) [R06.02])    Surgeons: Terrence Thompson DO Provider: Kelechi Almeida MD    Anesthesia Type: general ASA Status: 3          Anesthesia Type: general    Vitals  Vitals Value Taken Time   /77 03/30/23 1316   Temp 97 °F (36.1 °C) 03/30/23 1316   Pulse 60 03/30/23 1316   Resp 14 03/30/23 1316   SpO2 100 % 03/30/23 1316           Post Anesthesia Care and Evaluation    Patient location during evaluation: PACU  Patient participation: complete - patient participated  Level of consciousness: awake and alert  Pain score: 0  Pain management: adequate    Airway patency: patent  Anesthetic complications: No anesthetic complications  PONV Status: none  Cardiovascular status: hemodynamically stable and acceptable  Respiratory status: nonlabored ventilation, acceptable and nasal cannula  Hydration status: acceptable    Comments: Pt arrived to PACU with no issues during transport. Pt placed directly to monitors. Vital signs are within parameters. Pt maintaining ventilation spontaneously. No changes to dental. Report given to PACU and all question and concerns were addressed as well as the plan of care.

## 2023-03-31 LAB — GIE STN SPEC: NORMAL

## 2023-04-01 LAB
BACTERIA SPEC RESP CULT: NO GROWTH
GRAM STN SPEC: NORMAL
GRAM STN SPEC: NORMAL

## 2023-04-02 LAB
BACTERIA SPEC AEROBE CULT: NORMAL
GRAM STN SPEC: NORMAL
GRAM STN SPEC: NORMAL

## 2023-04-03 LAB
CREAT BLDA-MCNC: 0.8 MG/DL (ref 0.6–1.3)
REF LAB TEST METHOD: NORMAL
REF LAB TEST METHOD: NORMAL

## 2023-04-20 LAB
FUNGUS WND CULT: NORMAL
FUNGUS WND CULT: NORMAL
MYCOBACTERIUM SPEC CULT: NORMAL
MYCOBACTERIUM SPEC CULT: NORMAL
NIGHT BLUE STAIN TISS: NORMAL
NIGHT BLUE STAIN TISS: NORMAL

## 2023-04-21 ENCOUNTER — TELEPHONE (OUTPATIENT)
Dept: CARDIOLOGY | Facility: CLINIC | Age: 58
End: 2023-04-21
Payer: COMMERCIAL

## 2023-04-21 NOTE — TELEPHONE ENCOUNTER
Missed scheduled remote cardiac device transmission. Called patient-left voice mail message to send in transmission or call device clinic for assistance.

## 2023-05-01 ENCOUNTER — OFFICE VISIT (OUTPATIENT)
Dept: CARDIOLOGY | Facility: CLINIC | Age: 58
End: 2023-05-01
Payer: COMMERCIAL

## 2023-05-01 VITALS
SYSTOLIC BLOOD PRESSURE: 110 MMHG | OXYGEN SATURATION: 99 % | HEIGHT: 65 IN | BODY MASS INDEX: 24.83 KG/M2 | WEIGHT: 149 LBS | HEART RATE: 60 BPM | DIASTOLIC BLOOD PRESSURE: 70 MMHG

## 2023-05-01 DIAGNOSIS — R55 NEUROCARDIOGENIC SYNCOPE: ICD-10-CM

## 2023-05-01 DIAGNOSIS — Z95.810 ICD (IMPLANTABLE CARDIOVERTER-DEFIBRILLATOR) IN PLACE: ICD-10-CM

## 2023-05-01 DIAGNOSIS — I47.29 NONSUSTAINED VENTRICULAR TACHYCARDIA: ICD-10-CM

## 2023-05-01 DIAGNOSIS — I36.1 NONRHEUMATIC TRICUSPID VALVE REGURGITATION: ICD-10-CM

## 2023-05-01 DIAGNOSIS — I42.2 HYPERTROPHIC NONOBSTRUCTIVE CARDIOMYOPATHY: Primary | ICD-10-CM

## 2023-05-01 DIAGNOSIS — I50.33 ACUTE ON CHRONIC DIASTOLIC HEART FAILURE: ICD-10-CM

## 2023-05-01 DIAGNOSIS — I34.0 NONRHEUMATIC MITRAL VALVE REGURGITATION: ICD-10-CM

## 2023-05-01 NOTE — PROGRESS NOTES
OFFICE VISIT  NOTE  [facility] CARDIOLOGY [place] INT GEN      Name: Smiley Sibley    Date: 2023  MRN:  [id number]  :  1965      REFERRING/PRIMARY PROVIDER:  System, Provider Not In    Chief Complaint   Patient presents with   • Follow-up     6 MONTH FOLLOW UP       HPI: Smiley Sibley is a 57 y.o. female who presents today for new consultation for hypertrophic obstructive cardiomyopathy.  Diagnosed 20 to 30 years ago after birth of her daughter. She sees [facility] for this condition as well.  Last echo 2022 at [facility] showed EF 50-55% with moderate MR due to Paul, but no intracavitary gradient at rest or provocation, with interventricular septum measuring 1.6 cm.  Echo 2021 showed septum 2 cm.  Cardiac CTA 3/15/2023 showed normal coronaries, did show thickening of the interventricular septum, also showed mediastinal and left hilar lymphadenopathy with a left pleural-based nodules, follow-up EBUS by Dr. Thompson with biopsy of lymph nodes showed no malignancy.  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    Past Medical History:   Diagnosis Date   • Abnormal ECG    • Anxiety and depression 10/21/2016   • Arrhythmia    • Asthma    • Congenital heart disease    • Coronary artery disease    • Heart murmur    • Hyperlipidemia    • Hypertrophic obstructive cardiomyopathy 10/21/2016   • Hypertrophic obstructive cardiomyopathy    • Migraine headache 10/21/2016   • Mitral valve prolapse    • Neurocardiogenic syncope 10/21/2016   • Nonsustained ventricular tachycardia 10/21/2016   • Sleep apnea    • Valvular disease        Past Surgical History:   Procedure Laterality Date   • ABLATION OF DYSRHYTHMIC FOCUS  2018   • BRONCHOSCOPY N/A 3/30/2023    Procedure: BRONCHOSCOPY WITH ENDOBRONCHIAL ULTRASOUND;  Surgeon: Terrence Thompson DO;  Location: [facility] ENDOSCOPY;  Service:  Pulmonary;  Laterality: N/A;   • CARDIAC CATHETERIZATION  2010   • CARDIAC DEFIBRILLATOR PLACEMENT  07/29/2010   • CARDIAC ELECTROPHYSIOLOGY PROCEDURE N/A 9/13/2018    Procedure: Ablation SVT;  Surgeon: Maximino Nassar DO;  Location:  [facility];  Service: Cardiovascular   • CARDIAC ELECTROPHYSIOLOGY PROCEDURE N/A 7/20/2020    Procedure: ICD DC generator change-  3-4 weeks SJ;  Surgeon: Sanya Stiles MD;  Location:  [facility];  Service: Cardiology;  Laterality: N/A;   • COLONOSCOPY     • DILATATION AND CURETTAGE     • HYSTEROTOMY     • INSERT / REPLACE / REMOVE PACEMAKER  07/29/2010       Social History     Socioeconomic History   • Marital status:    Tobacco Use   • Smoking status: Never   • Smokeless tobacco: Never   Vaping Use   • Vaping Use: Never used   Substance and Sexual Activity   • Alcohol use: Yes     Alcohol/week: 1.0 - 2.0 standard drink     Types: 1 - 2 Glasses of wine per week     Comment: 3-4 times/year   • Drug use: No   • Sexual activity: Yes     Partners: Male     Birth control/protection: Surgical       Family History   Problem Relation Age of Onset   • Arrhythmia Brother    • Heart disease Brother    • Arrhythmia Mother    • Heart disease Mother         ROS:   Constitutional no fever,  no weight loss   Skin no rash, no subcutaneous nodules   Otolaryngeal no difficulty swallowing   Cardiovascular See HPI   Pulmonary no cough, no sputum production   Gastrointestinal no constipation, no diarrhea   Genitourinary no dysuria, no hematuria   Hematologic no easy bruisability, no abnormal bleeding   Musculoskeletal no muscle pain   Neurologic no dizziness, no falls         No Known Allergies      Current Outpatient Medications:   •  albuterol sulfate  (90 Base) MCG/ACT inhaler, Inhale 2 puffs Every 4 (Four) Hours As Needed for Wheezing., Disp: 18 g, Rfl: 11  •  dapagliflozin Propanediol (Farxiga) 10 MG tablet, Take 10 mg by mouth Daily., Disp: , Rfl:  "  •  Eliquis 5 MG tablet tablet, TAKE ONE TABLET BY MOUTH TWICE A DAY (Patient taking differently: Take 1 tablet by mouth Every 12 (Twelve) Hours.), Disp: 60 tablet, Rfl: 6  •  sotalol (BETAPACE) 80 MG tablet, Take 1 tablet by mouth Every 12 (Twelve) Hours. (Patient taking differently: Take 1 tablet by mouth 2 (Two) Times a Day.), Disp: 60 tablet, Rfl: 6    Vitals:    05/01/23 1108   BP: 110/70   BP Location: Left arm   Patient Position: Sitting   Pulse: 60   SpO2: 99%   Weight: 67.6 kg (149 lb)   Height: 165.1 cm (65\")     Body mass index is 24.79 kg/m².    PHYSICAL EXAM:    General Appearance:   · well developed  · well nourished  HENT:   · oropharynx moist  · lips not cyanotic  Neck:  · thyroid not enlarged  · supple  Respiratory:  · no respiratory distress  · normal breath sounds  · no rales  Cardiovascular:  · no jugular venous distention  · regular rhythm  · apical impulse normal  · S1 normal, S2 normal  · no S3, no S4   · no murmur  · no rub, no thrill  · carotid pulses normal; no bruit  · lower extremity edema: none      Musculoskeletal:  · no clubbing of fingers.   · normocephalic, head atraumatic  Skin:   · warm, dry  Psychiatric:  · judgement and insight appropriate  · normal mood and affect    RESULTS:   [facility] made today, see scanned report.  94% a paced P wave greater than 5 mV, threshold 0.75 V, 400 and ohms impedance, RV 19% paced, R wave 11.4, threshold 0.75 V, impedance 310 ohms.  Longest atrial fibrillation 1.2 hours VT x2 actually A. fib with RVR.  A pace turned off on PVC  Results for orders placed in visit on 06/11/21    Adult Transthoracic Echo Complete w/ Color, Spectral and Contrast if necessary per protocol    Interpretation Summary  · Estimated left ventricular EF = 50% Estimated left ventricular EF was in disagreement with the calculated left ventricular EF. Left ventricular ejection fraction appears to be 51 - 55%. Left " ventricular systolic function is normal.  · Left ventricular wall thickness is consistent with moderate to severe septal asymmetric hypertrophy.  · Left ventricular mass index is increased.  · The findings are consistent with non-obstructive, hypertrophic cardiomyopathy.  · Left ventricular diastolic function is consistent with (grade I) impaired relaxation.  · Estimated right ventricular systolic pressure from tricuspid regurgitation is markedly elevated (>55 mmHg).  · The right atrial cavity is moderately dilated.  · The left atrial cavity is moderately dilated  · Moderate Tr and Mild MR        Labs:  Lab Results   Component Value Date    AST 35 (H) 03/28/2023    ALT 27 03/28/2023     No results found for: HGBA1C  No components found for: CREATINININE  eGFR Non  Amer   Date Value Ref Range Status   07/17/2020 55 (L) >60 mL/min/1.73 Final   09/13/2018 65 >60 mL/min/1.73 Final       Most recent PCP note, imaging tests, and labs reviewed.    ASSESSMENT:  Problem List Items Addressed This Visit        Cardiac and Vasculature    Nonsustained ventricular tachycardia    Overview     A. Holter monitor 07/15/2010 with nonsustained VT         Neurocardiogenic syncope    Hypertrophic nonobstructive cardiomyopathy (HCC) - Primary    Overview     6/11/21 Echo: EF 50%, moderate to severe septal asymmetric hypertrophy, left ventricular mass index increase, non-obstructive hypertrophic cardiomyopathy, grade I impaired relaxation, RVSP >55 mmHg, right and left atrial moderately dilated, moderate TR and mild MR  A. Left heart catheterization in 2005 with normal coronary arteries.  B. Echocardiogram 02/05/2005 with septal hypertrophy, DUONG, moderate MR, IHSS with a gradient of 22, EF 60%.  C. Stress echocardiogram, 07/2010 normal per patient, no data         Relevant Orders    Adult Transthoracic Echo Complete w/ Color, Spectral and Contrast if necessary per protocol    ICD (implantable cardioverter-defibrillator) in place     Acute on chronic diastolic heart failure    Nonrheumatic mitral valve regurgitation    Nonrheumatic tricuspid valve regurgitation       PLAN:    1.  Hypertrophic obstructive cardiomyopathy:  No gradient on recent echo 6/2022 with septum measuring 1.6 cm with moderate MR due to Paul.  She follows with [facility]  Without a gradient she is unlikely to qualify for [place]avacamten, but encouraged her to discuss this with her [facility] provider.  She is on sotalol for beta-blocker  Discussed importance of adequate hydration    Continue Farxiga which was started by [facility]    Repeat echocardiogram for surveillance    2.  Dyspnea on exertion:  Likely multifactorial  Coronary CTA showed normal coronaries with 0 coronary calcium score  She follows with pulmonary    3.  Paroxysmal atrial fibrillation:  Recent 1.2-hour episode, Eliquis started, continue sotalol, EKG stable    4.  Presence of dual-chamber [facility] ICD:  Followed by Dr. Paulino      Return to clinic in 12 months, or sooner as needed.    Thank you for the opportunity to share in the care of your patient; please do not hesitate to call me with any questions.     Brian Kulkarni MD, [facility]  Office: [phone] [address] [place] [address]    05/01/23

## 2023-05-11 ENCOUNTER — OFFICE VISIT (OUTPATIENT)
Dept: PULMONOLOGY | Facility: CLINIC | Age: 58
End: 2023-05-11
Payer: COMMERCIAL

## 2023-05-11 VITALS
OXYGEN SATURATION: 98 % | BODY MASS INDEX: 25.36 KG/M2 | HEART RATE: 60 BPM | DIASTOLIC BLOOD PRESSURE: 72 MMHG | WEIGHT: 152.2 LBS | TEMPERATURE: 98.2 F | SYSTOLIC BLOOD PRESSURE: 112 MMHG | HEIGHT: 65 IN

## 2023-05-11 DIAGNOSIS — R59.0 MEDIASTINAL LYMPHADENOPATHY: ICD-10-CM

## 2023-05-11 DIAGNOSIS — R06.02 SOB (SHORTNESS OF BREATH): Primary | ICD-10-CM

## 2023-05-11 LAB
FUNGUS WND CULT: NORMAL
FUNGUS WND CULT: NORMAL
MYCOBACTERIUM SPEC CULT: NORMAL
NIGHT BLUE STAIN TISS: NORMAL

## 2023-05-11 PROCEDURE — 99213 OFFICE O/P EST LOW 20 MIN: CPT | Performed by: NURSE PRACTITIONER

## 2023-05-11 NOTE — PROGRESS NOTES
[facility] Pulmonary Follow up    CHIEF COMPLAINT    Dyspnea with exertion    HISTORY OF PRESENT ILLNESS    Smiley Sibley is a 57 y.o.female here today for follow-up.  She was last seen in the office by Dr. Thompson in March.  He was referred to office for mediastinal lymphadenopathy was found on the CT angio per Dr. Kulkarni.  She had a bronchoscopy performed on 3/30 per Dr. Thompson and is here today to discuss the results.    She denies any changes to her medical history since her last appointment.  She continues to have shortness of breath with exertion and does try to stay as active as possible.  She does continue to follow closely with cardiology for her history of cardiomyopathy.    She denies any sputum production or hemoptysis.  She denies any chest pain or palpitations.  She denies any lower extremity edema or calf tenderness.    She denies any reflux symptoms.    Patient Active Problem List   Diagnosis   • Nonsustained ventricular tachycardia   • Neurocardiogenic syncope   • Hypertrophic nonobstructive cardiomyopathy (HCC)   • Anxiety and depression   • Migraine headache   • ICD (implantable cardioverter-defibrillator) in place   • SVT (supraventricular tachycardia)   • Long term current use of antiarrhythmic medical therapy   • Acute on chronic diastolic heart failure   • Atrial flutter   • Nonrheumatic mitral valve regurgitation   • Nonrheumatic tricuspid valve regurgitation   • SOB (shortness of breath)   • Mediastinal lymphadenopathy       No Known Allergies    Current Outpatient Medications:   •  albuterol sulfate  (90 Base) MCG/ACT inhaler, Inhale 2 puffs Every 4 (Four) Hours As Needed for Wheezing., Disp: 18 g, Rfl: 11  •  dapagliflozin Propanediol (Farxiga) 10 MG tablet, Take 10 mg by mouth Daily., Disp: , Rfl:   •  Eliquis 5 MG tablet tablet, TAKE ONE TABLET BY MOUTH TWICE A DAY (Patient taking differently: Take 1 tablet by mouth Every 12 (Twelve) Hours.), Disp: 60 tablet, Rfl: 6  •  sotalol  "(BETAPACE) 80 MG tablet, Take 1 tablet by mouth Every 12 (Twelve) Hours. (Patient taking differently: Take 1 tablet by mouth 2 (Two) Times a Day.), Disp: 60 tablet, Rfl: 6  MEDICATION LIST AND ALLERGIES REVIEWED.    Social History     Tobacco Use   • Smoking status: Never   • Smokeless tobacco: Never   Vaping Use   • Vaping Use: Never used   Substance Use Topics   • Alcohol use: Yes     Alcohol/week: 1.0 - 2.0 standard drink     Types: 1 - 2 Glasses of wine per week     Comment: 3-4 times/year   • Drug use: No       FAMILY AND SOCIAL HISTORY REVIEWED.    Review of Systems   Constitutional: Negative for activity change, appetite change, fatigue, fever and unexpected weight change.   HENT: Negative for congestion, postnasal drip, rhinorrhea, sinus pressure, sore throat and voice change.    Eyes: Negative for visual disturbance.   Respiratory: Positive for shortness of breath. Negative for cough, chest tightness and wheezing.    Cardiovascular: Negative for chest pain, palpitations and leg swelling.   Gastrointestinal: Negative for abdominal distention, abdominal pain, nausea and vomiting.   Endocrine: Negative for cold intolerance and heat intolerance.   Genitourinary: Negative for difficulty urinating and urgency.   Musculoskeletal: Negative for arthralgias, back pain and neck pain.   Skin: Negative for color change and pallor.   Allergic/Immunologic: Negative for environmental allergies and food allergies.   Neurological: Negative for dizziness, syncope, weakness and light-headedness.   Hematological: Negative for adenopathy. Does not bruise/bleed easily.   Psychiatric/Behavioral: Negative for agitation and behavioral problems.   .    /72 (BP Location: Left arm, Patient Position: Sitting, Cuff Size: Adult)   Pulse 60   Temp 98.2 °F (36.8 °C)   Ht 165.1 cm (65\")   Wt 69 kg (152 lb 3.2 oz)   LMP  (LMP Unknown)   SpO2 98% Comment: Resting room air  BMI 25.33 kg/m²     Immunization History   Administered " Date(s) Administered   • COVID-19 (MODERNA) 1st,2nd,3rd Dose Monovalent 02/23/2021, 03/24/2021   • COVID-19 (MODERNA) BIVALENT 12+YRS 10/22/2022   • COVID-19 (MODERNA) Monovalent Original Booster 11/15/2021   • FluLaval/Fluzone >6mos 09/30/2020   • Flublok 18+yrs 11/15/2021, 10/22/2022   • Hepatitis A 09/30/2019   • Pneumococcal Conjugate 13-Valent (PCV13) 09/19/2016       Physical Exam  Vitals and nursing note reviewed.   Constitutional:       Appearance: She is well-developed. She is not diaphoretic.   HENT:      Head: Normocephalic and atraumatic.   Eyes:      Pupils: Pupils are equal, round, and reactive to light.   Neck:      Thyroid: No thyromegaly.   Cardiovascular:      Rate and Rhythm: Normal rate and regular rhythm.      Heart sounds: Normal heart sounds. No murmur heard.    No friction rub. No gallop.   Pulmonary:      Effort: Pulmonary effort is normal. No respiratory distress.      Breath sounds: Normal breath sounds. No wheezing or rales.   Chest:      Chest wall: No tenderness.   Abdominal:      General: Bowel sounds are normal.      Palpations: Abdomen is soft.      Tenderness: There is no abdominal tenderness.   Musculoskeletal:         General: No swelling. Normal range of motion.      Cervical back: Normal range of motion and neck supple.   Lymphadenopathy:      Cervical: No cervical adenopathy.   Skin:     General: Skin is warm and dry.      Capillary Refill: Capillary refill takes less than 2 seconds.   Neurological:      Mental Status: She is alert and oriented to person, place, and time.   Psychiatric:         Mood and Affect: Mood normal.         Behavior: Behavior normal.           RESULTS    Cytology on 3/30/2023  DIAGNOSIS:   A.     LYMPH NODE, FNA, STATION 11R:  Negative for malignant cells.   B.     LYMPH NODE, FNA, STATION 7:  Negative for malignant cells.   C.     LYMPH NODE, FNA, STATION 11L:  Negative for malignant cells.     PROBLEM LIST    Problem List Items Addressed This Visit         Pulmonary and Pneumonias    SOB (shortness of breath) - Primary       Symptoms and Signs    Mediastinal lymphadenopathy         DISCUSSION    Ms. Sibley is here for follow-up after she had a bronchoscopy.  She tolerated the procedure well and had no complications after the procedure.    We did review the cytology from her bronchoscopy that showed negative for malignant cells.  Her cultures are negative to date.  We have 1 more week left of her fungal cultures to remain.    At this time we do not have an identification of the mediastinal lymphadenopathy.  She does have a pulmonary nodule that may possibly need a biopsy in the future.  We could also repeat a CT scan in 3 months which will be due in June.  We also discussed a PET scan.    I am going to discuss further with Dr. Thompson to see how he would like to proceed.  I will notify the patient of any new orders.    We will schedule a follow-up after I discussed with Dr. Thompson.    I personally spent a total of 26 minutes on patient visit today including chart review, face to face with the patient obtaining the history and physical exam, review of pertinent images and tests, counseling and discussion and/or coordination of care as described above, and documentation.  Total time excludes time spent on other separate services such as performing procedures or test interpretation, if applicable.        Aspen Carvajal, APRN  05/11/202309:31 EDT  Electronically signed     Please note that portions of this note were completed with a voice recognition program.        CC: System, Provider Not In

## 2023-05-15 DIAGNOSIS — R59.0 MEDIASTINAL LYMPHADENOPATHY: Primary | ICD-10-CM

## 2023-05-15 NOTE — PROGRESS NOTES
I discussed the case with Dr. Thompson and we are going to repeat a CT scan in 3 months with contrast to further evaluate her mediastinal lymphadenopathy.  This will be due in mid June.  I have placed the order and she is agreeable with this plan.  We will set her up for a follow-up in 6 months which will be due in December with Dr. Thompson.

## 2023-05-17 LAB
MYCOBACTERIUM SPEC CULT: NORMAL
NIGHT BLUE STAIN TISS: NORMAL

## 2023-05-24 LAB
MYCOBACTERIUM SPEC CULT: ABNORMAL
NIGHT BLUE STAIN TISS: ABNORMAL

## 2023-05-31 LAB
MYCOBACTERIUM SPEC CULT: ABNORMAL
NIGHT BLUE STAIN TISS: ABNORMAL

## 2023-06-02 ENCOUNTER — HOSPITAL ENCOUNTER (OUTPATIENT)
Dept: CARDIOLOGY | Facility: HOSPITAL | Age: 58
Discharge: HOME | End: 2023-06-02

## 2023-06-02 DIAGNOSIS — I42.2 HYPERTROPHIC NONOBSTRUCTIVE CARDIOMYOPATHY: ICD-10-CM

## 2023-06-02 LAB
BH CV ECHO MEAS - AO MAX PG: 5 MMHG
BH CV ECHO MEAS - AO MEAN PG: 3 MMHG
BH CV ECHO MEAS - AO ROOT DIAM: 2.8 CM
BH CV ECHO MEAS - AO V2 MAX: 112 CM/SEC
BH CV ECHO MEAS - AO V2 VTI: 24.9 CM
BH CV ECHO MEAS - AVA(I,D): 2.8 CM2
BH CV ECHO MEAS - EDV(CUBED): 64 ML
BH CV ECHO MEAS - EDV(MOD-SP2): 92.7 ML
BH CV ECHO MEAS - EDV(MOD-SP4): 106 ML
BH CV ECHO MEAS - EF(MOD-BP): 45.1 %
BH CV ECHO MEAS - EF(MOD-SP2): 47.4 %
BH CV ECHO MEAS - EF(MOD-SP4): 38.4 %
BH CV ECHO MEAS - ESV(CUBED): 24.4 ML
BH CV ECHO MEAS - ESV(MOD-SP2): 48.8 ML
BH CV ECHO MEAS - ESV(MOD-SP4): 65.3 ML
BH CV ECHO MEAS - FS: 27.5 %
BH CV ECHO MEAS - IVS/LVPW: 1 CM
BH CV ECHO MEAS - IVSD: 1.3 CM
BH CV ECHO MEAS - LA DIMENSION: 5 CM
BH CV ECHO MEAS - LAT PEAK E' VEL: 5 CM/SEC
BH CV ECHO MEAS - LV MASS(C)D: 186.5 GRAMS
BH CV ECHO MEAS - LV MAX PG: 3.8 MMHG
BH CV ECHO MEAS - LV MEAN PG: 2 MMHG
BH CV ECHO MEAS - LV V1 MAX: 97.5 CM/SEC
BH CV ECHO MEAS - LV V1 VTI: 22.4 CM
BH CV ECHO MEAS - LVIDD: 4 CM
BH CV ECHO MEAS - LVIDS: 2.9 CM
BH CV ECHO MEAS - LVOT AREA: 3.1 CM2
BH CV ECHO MEAS - LVOT DIAM: 2 CM
BH CV ECHO MEAS - LVPWD: 1.3 CM
BH CV ECHO MEAS - MED PEAK E' VEL: 4.2 CM/SEC
BH CV ECHO MEAS - MV A MAX VEL: 64.5 CM/SEC
BH CV ECHO MEAS - MV DEC TIME: 0.24 MSEC
BH CV ECHO MEAS - MV E MAX VEL: 62.4 CM/SEC
BH CV ECHO MEAS - MV E/A: 0.97
BH CV ECHO MEAS - MV MAX PG: 1.71 MMHG
BH CV ECHO MEAS - MV MEAN PG: 1 MMHG
BH CV ECHO MEAS - MV V2 VTI: 23.9 CM
BH CV ECHO MEAS - MVA(VTI): 2.9 CM2
BH CV ECHO MEAS - PA ACC TIME: 0.12 SEC
BH CV ECHO MEAS - PA PR(ACCEL): 26.8 MMHG
BH CV ECHO MEAS - PA V2 MAX: 80.2 CM/SEC
BH CV ECHO MEAS - RAP SYSTOLE: 8 MMHG
BH CV ECHO MEAS - RVSP: 51 MMHG
BH CV ECHO MEAS - SV(LVOT): 70.4 ML
BH CV ECHO MEAS - SV(MOD-SP2): 43.9 ML
BH CV ECHO MEAS - SV(MOD-SP4): 40.7 ML
BH CV ECHO MEAS - TAPSE (>1.6): 2.26 CM
BH CV ECHO MEAS - TR MAX PG: 42.5 MMHG
BH CV ECHO MEAS - TR MAX VEL: 326 CM/SEC
BH CV ECHO MEASUREMENTS AVERAGE E/E' RATIO: 13.57
BH CV VAS BP LEFT ARM: NORMAL MMHG
BH CV XLRA - RV BASE: 3.7 CM
BH CV XLRA - RV LENGTH: 5 CM
BH CV XLRA - RV MID: 3.2 CM
BH CV XLRA - TDI S': 8.5 CM/SEC
LEFT ATRIUM VOLUME INDEX: 50.7 ML/M2
LV EF 2D ECHO EST: 35 %
MAXIMAL PREDICTED HEART RATE: 164 BPM
STRESS TARGET HR: 139 BPM

## 2023-06-02 PROCEDURE — 93306 TTE W/DOPPLER COMPLETE: CPT

## 2023-06-02 PROCEDURE — 93306 TTE W/DOPPLER COMPLETE: CPT | Performed by: INTERNAL MEDICINE

## 2023-06-09 ENCOUNTER — TELEPHONE (OUTPATIENT)
Dept: CARDIOLOGY | Facility: CLINIC | Age: 58
End: 2023-06-09
Payer: COMMERCIAL

## 2023-06-09 NOTE — TELEPHONE ENCOUNTER
----- Message from Brian Kulkarni MD sent at 6/9/2023  7:45 AM EDT -----  Please inform the patient of their test results.  Overall improved findings, septum has decreased in thickness, EF is stable, mitral regurgitation is stable.. Thank you.

## 2023-07-27 ENCOUNTER — OFFICE VISIT (OUTPATIENT)
Dept: CARDIOLOGY | Facility: CLINIC | Age: 58
End: 2023-07-27
Payer: COMMERCIAL

## 2023-07-27 VITALS
BODY MASS INDEX: 25.66 KG/M2 | HEART RATE: 60 BPM | WEIGHT: 154 LBS | HEIGHT: 65 IN | DIASTOLIC BLOOD PRESSURE: 66 MMHG | OXYGEN SATURATION: 98 % | SYSTOLIC BLOOD PRESSURE: 108 MMHG

## 2023-07-27 DIAGNOSIS — I42.2 HYPERTROPHIC NONOBSTRUCTIVE CARDIOMYOPATHY: ICD-10-CM

## 2023-07-27 DIAGNOSIS — Z95.810 ICD (IMPLANTABLE CARDIOVERTER-DEFIBRILLATOR) IN PLACE: Primary | ICD-10-CM

## 2023-07-27 NOTE — PROGRESS NOTES
Cardiac Electrophysiology Outpatient Note  [facility]    Office Visit     Smiley Sibley  [id number]  02/24/2022    Primary Care Physician: System, Provider Not In    Referred By: No ref. provider found    Subjective     Chief Complaint   Patient presents with    Cardiomyopathy       History of Present Illness:   Smiley Sibley is a 57 y.o. female who presents to my electrophysiology clinic for follow up of hypertrophic cardiomyopathy, status post ICD for primary prevention,, atrial flutter status post ablation.   She was last seen in clinic approximately 6 months ago.      She went to [facility] for evaluation of hypertrophic cardiomyopathy.  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  We also set her up to see Dr. Kulkarni so that she had a general cardiologist closer to home.  She overall is doing okay since her last visit, however she continues to have shortness of breath that is limiting her.  She does states she has to stop after about 100 yards of walking.  This is on a flat surface, any incline is much worse.  She has been evaluated by Dr. Thompson with pulmonology as well, and was eventually found to have MAC.  She is scheduled to see him soon for this.    Past Medical History:   Diagnosis Date    Abnormal ECG 1998    Anxiety and depression 10/21/2016    Arrhythmia     Asthma     Congenital heart disease 1998    Coronary artery disease     Heart murmur 1966    Hyperlipidemia 2018    Hypertrophic obstructive cardiomyopathy 10/21/2016    Hypertrophic obstructive cardiomyopathy     Migraine headache 10/21/2016    Mitral valve prolapse 1998    Neurocardiogenic syncope 10/21/2016    Nonsustained ventricular tachycardia 10/21/2016    Sleep apnea 2013    Valvular disease 1998       Past Surgical History:   Procedure Laterality Date    ABLATION OF DYSRHYTHMIC FOCUS  09/13/2018    BRONCHOSCOPY N/A 3/30/2023    Procedure: BRONCHOSCOPY  WITH ENDOBRONCHIAL ULTRASOUND;  Surgeon: Terrence Thompson DO;  Location:  [facility];  Service: Pulmonary;  Laterality: N/A;    CARDIAC CATHETERIZATION  2010    CARDIAC DEFIBRILLATOR PLACEMENT  07/29/2010    CARDIAC ELECTROPHYSIOLOGY PROCEDURE N/A 9/13/2018    Procedure: Ablation SVT;  Surgeon: Maximino Nassar DO;  Location:  [facility];  Service: Cardiovascular    CARDIAC ELECTROPHYSIOLOGY PROCEDURE N/A 7/20/2020    Procedure: ICD DC generator change-  3-4 weeks SJ;  Surgeon: Sanya Stiles MD;  Location:  [facility];  Service: Cardiology;  Laterality: N/A;    COLONOSCOPY      DILATATION AND CURETTAGE      HYSTEROTOMY      INSERT / REPLACE / REMOVE PACEMAKER  07/29/2010       Family History   Problem Relation Age of Onset    Arrhythmia Brother     Heart disease Brother     Arrhythmia Mother     Heart disease Mother        Social History     Socioeconomic History    Marital status:    Tobacco Use    Smoking status: Never    Smokeless tobacco: Never   Vaping Use    Vaping Use: Never used   Substance and Sexual Activity    Alcohol use: Yes     Alcohol/week: 1.0 - 2.0 standard drink     Types: 1 - 2 Glasses of wine per week     Comment: 3-4 times/year    Drug use: No    Sexual activity: Yes     Partners: Male     Birth control/protection: Surgical         Current Outpatient Medications:     albuterol sulfate  (90 Base) MCG/ACT inhaler, Inhale 2 puffs Every 4 (Four) Hours As Needed for Wheezing., Disp: 18 g, Rfl: 11    apixaban (Eliquis) 5 MG tablet tablet, Take 1 tablet by mouth Every 12 (Twelve) Hours., Disp: 60 tablet, Rfl: 6    dapagliflozin Propanediol (Farxiga) 10 MG tablet, Take 10 mg by mouth Daily., Disp: , Rfl:     sotalol (BETAPACE) 80 MG tablet, Take 1 tablet by mouth Every 12 (Twelve) Hours. (Patient taking differently: Take 1 tablet by mouth 2 (Two) Times a Day.), Disp: 60 tablet, Rfl: 6    Allergies:   No Known Allergies    Objective  "  Vital Signs: Blood pressure 108/66, pulse 60, height 165.1 cm (65\"), weight 69.9 kg (154 lb), SpO2 98 %.    PHYSICAL EXAM  General appearance: Awake, alert, cooperative  Head: Normocephalic, without obvious abnormality, atraumatic  Neck: No JVD  Lungs: Clear to ascultation bilaterally  Heart: Regular rate and rhythm, no murmurs, 2+ LE pulses, no lower extremity swelling  Skin: Skin color, turgor normal, no rashes or lesions  Neurologic: Grossly normal     Lab Results   Component Value Date    GLUCOSE 99 03/28/2023    CALCIUM 10.0 03/28/2023     03/28/2023    K 4.4 03/28/2023    CO2 27.0 03/28/2023     03/28/2023    BUN 20 03/28/2023    CREATININE 1.00 06/26/2023    EGFRIFNONA 55 (L) 07/17/2020    BCR 20.4 03/28/2023    ANIONGAP 10.0 03/28/2023     Lab Results   Component Value Date    WBC 8.60 03/28/2023    HGB 15.0 03/28/2023    HCT 47.1 (H) 03/28/2023    MCV 95.7 03/28/2023     03/28/2023     Lab Results   Component Value Date    INR 1.11 03/28/2023    PROTIME 14.3 03/28/2023     No results found for: TSH, E7GKNXS, P2STYZS, THYROIDAB       Results for orders placed during the hospital encounter of 06/02/23    Adult Transthoracic Echo Complete w/ Color, Spectral and Contrast if necessary per protocol    Interpretation Summary    Left ventricular systolic function is low normal. Calculated left ventricular EF = 45.1% Left ventricular ejection fraction appears to be 46 - 50%.    Left ventricular wall thickness is consistent with mild concentric hypertrophy.  Moderate sigmoid-shaped ventricular septum is present.    Left ventricular diastolic function is consistent with (grade II w/high LAP) pseudonormalization.    The left atrial cavity is moderately dilated.    Moderate mitral valve regurgitation is present.    Moderate tricuspid valve regurgitation is present.    Estimated right ventricular systolic pressure from tricuspid regurgitation is moderately elevated (45-55 mmHg). Calculated right " ventricular systolic pressure from tricuspid regurgitation is 51 mmHg.    Much regurgitation is slightly increased since prior study in 2021.  EF appears very similar.         I personally viewed and interpreted the patient's EKG/Telemetry/lab data      ECG 12 Lead    Date/Time: 7/27/2023 1:52 PM  Performed by: See Paulino MD  Authorized by: See Paulino MD   Comparison: compared with previous ECG from 3/22/2023  Similar to previous ECG  Comments: Atrial paced rhythm with ventricular pacing        Smiley Sibley  reports that she has never smoked. She has never used smokeless tobacco..    Assessment & Plan    1. Hypertrophic obstructive cardiomyopathy (HCC)  She has a history of hypertrophic obstructive cardiomyopathy.  She has been seen in [facility] for evaluation of this.  She has significant elevation in her pulmonary pressures.  She is currently on Farxiga.  I referred her to Dr. Kulkarni as well for additional management.  She continues to have significant short of breath.  She does not have significant gradient, but does have a mildly reduced ejection fraction and significantly elevated pulmonary pressures.    2. ICD (implantable cardioverter-defibrillator) in place  Her [employer] dual-chamber ICD was interrogated today and is functioning well she has approximately 4.7 of battery life remaining.  She is pacing 95% of the time in the atrium and 30% of the time in the ventricle.  Her ventricular pacing percentage has remained around 30%.  I think we can continue to monitor this.  No indication to upgrade her device at the current time.  If her biventricular pacing percentage increases however this may be warranted given her mildly reduced ejection fraction.    3. Typical atrial flutter (HCC)  History of typical atrial flutter status post ablation.  Per records she was noted to have brief runs of atrial fibrillation during that procedure.  She was started on sotalol and has since been maintained on  that.      4.  Atrial fibrillation  She is also developed some atrial fibrillation on her device.  We started her on anticoagulation for this she is tolerating this quite well.  At the current time her burden has been quite low and her symptoms are minimal.  We will continue her on sotalol continue to monitor.      5.  Pulmonary HTN  Initial echo showed RVSP greater than 55, with most recent echo with RVSP estimated 51 mmHg.  Unsure the etiology of this.  We will defer this to her general cardiology team..      6.  Accelerated junctional rhythm  She was noted to have an accelerated junctional rhythm on her EKG prior at about 113 bpm.  On intracardiac electrograms this was also consistent with a junctional rhythm.  This was terminated during device interrogation.  No tracings of this were saved.  It seems as though it terminated very quickly with DDD pacing.  This would suggest a possible reentrant source for this but is uncertain.  No recurrence of this for now we will continue to monitor.    Follow Up:  Return in about 6 months (around 1/27/2024) for Recheck, [employer]/ИРИНА.      Thank you for allowing me to participate in the care of your patient. Please do not hesitate to contact me with additional questions or concerns.      See Paulino M.D.  Cardiac Electrophysiologist  [facility]

## 2023-07-31 RX ORDER — SOTALOL HYDROCHLORIDE 80 MG/1
TABLET ORAL
Qty: 60 TABLET | Refills: 6 | Status: SHIPPED | OUTPATIENT
Start: 2023-07-31

## 2023-08-01 ENCOUNTER — TELEPHONE (OUTPATIENT)
Dept: CARDIOLOGY | Facility: CLINIC | Age: 58
End: 2023-08-01
Payer: COMMERCIAL

## 2023-08-01 DIAGNOSIS — R06.02 SOB (SHORTNESS OF BREATH): Primary | ICD-10-CM

## 2023-08-01 DIAGNOSIS — I50.33 ACUTE ON CHRONIC DIASTOLIC HEART FAILURE: ICD-10-CM

## 2023-08-11 ENCOUNTER — OFFICE VISIT (OUTPATIENT)
Dept: PULMONOLOGY | Facility: CLINIC | Age: 58
End: 2023-08-11
Payer: COMMERCIAL

## 2023-08-11 VITALS
SYSTOLIC BLOOD PRESSURE: 92 MMHG | HEIGHT: 65 IN | WEIGHT: 153.1 LBS | BODY MASS INDEX: 25.51 KG/M2 | OXYGEN SATURATION: 97 % | HEART RATE: 65 BPM | DIASTOLIC BLOOD PRESSURE: 58 MMHG | TEMPERATURE: 97.8 F

## 2023-08-11 DIAGNOSIS — R91.1 NODULE OF UPPER LOBE OF LEFT LUNG: ICD-10-CM

## 2023-08-11 DIAGNOSIS — A31.0 MYCOBACTERIUM AVIUM COMPLEX: ICD-10-CM

## 2023-08-11 DIAGNOSIS — I48.92 ATRIAL FLUTTER, UNSPECIFIED TYPE: ICD-10-CM

## 2023-08-11 DIAGNOSIS — R59.0 MEDIASTINAL LYMPHADENOPATHY: Primary | ICD-10-CM

## 2023-08-11 NOTE — PROGRESS NOTES
"Follow Up Office Note       Patient Name: Smiley Sibley    Referring Physician: No ref. provider found    Chief Complaint:    Chief Complaint   Patient presents with    Shortness of Breath     Follow up       History of Present Illness: Smiley Sibley is a 58 y.o. female who is here today to follow-up care with Pulmonary.  Patient has a past medical history significant for chronic diastolic heart failure, atrial flutter, hypertrophic obstructive cardiomyopathy with ICD in place, neurocardiogenic syncope, anxiety depression, and SVT.  Patient with a mild cough currently.  But nothing concerning denies any chest pain, nausea, fever, chills.  Shortness of breath is overall stable.    Review of Systems:   Review of Systems   Constitutional:  Negative for chills, fatigue and fever.   HENT:  Negative for congestion and voice change.    Eyes:  Negative for blurred vision.   Respiratory:  Negative for cough, shortness of breath and wheezing.    Cardiovascular:  Negative for chest pain.   Skin:  Negative for dry skin.   Hematological:  Negative for adenopathy.   Psychiatric/Behavioral:  Negative for agitation and depressed mood.      The following portions of the patient's history were reviewed and updated as appropriate: allergies, current medications, past family history, past medical history, past social history, past surgical history and problem list.    Physical Exam:  Vital Signs:   Vitals:    08/11/23 0959   BP: 92/58   BP Location: Left arm   Patient Position: Sitting   Cuff Size: Adult   Pulse: 65   Temp: 97.8 øF (36.6 øC)   SpO2: 97%  Comment: Room air at rest   Weight: 69.4 kg (153 lb 1.6 oz)   Height: 165.1 cm (65\")       Physical Exam  Vitals and nursing note reviewed.   Constitutional:       General: She is not in acute distress.     Appearance: She is well-developed and normal weight. She is not ill-appearing or toxic-appearing.   HENT:      Head: Normocephalic and atraumatic.   Cardiovascular:      Rate " and Rhythm: Normal rate and regular rhythm.      Pulses: Normal pulses.      Heart sounds: Normal heart sounds. No murmur heard.    No friction rub. No gallop.   Pulmonary:      Effort: Pulmonary effort is normal. No respiratory distress.      Breath sounds: Normal breath sounds. No wheezing, rhonchi or rales.   Musculoskeletal:      Right lower leg: No edema.      Left lower leg: No edema.   Skin:     General: Skin is warm and dry.   Neurological:      Mental Status: She is alert and oriented to person, place, and time.       Immunization History   Administered Date(s) Administered    COVID-19 (MODERNA) 1st,2nd,3rd Dose Monovalent 02/23/2021, 03/24/2021    COVID-19 (MODERNA) BIVALENT 12+YRS 10/22/2022    COVID-19 (MODERNA) Monovalent Original Booster 11/15/2021    Flublok 18+yrs 11/15/2021, 10/22/2022    Fluzone >6mos 09/30/2020    Hepatitis A 09/30/2019    Pneumococcal Conjugate 13-Valent (PCV13) 09/19/2016       Results Review:   - CT of the chest from 3/15/2023, that shows significant mediastinal and hilar lymphadenopathy, in addition to that there is a left upper lobe noncalcified nodule measuring 1.2 cm.              - chest x-ray on 11/29/2022 shows mild cardiac enlargement, with no acute cardiopulmonary findings.  - PFT from 11/8/2022 showed mild obstruction without restriction and normal DLCO, no significant bronchodilator response was seen.  - pulmonary function testing from 11/29/2020 which showed no obstruction or restriction with a normal DLCO.  -I personally reviewed the patient's cytology results from bronchoscopy with EBUS on 3/30/2023, cytology was all negative for any malignant cells, also no signs of any granulomas, flow cytometry was negative as well  -Personally reviewed the patient's culture results from bronchoscopy on 3/30/2023 which showed the fungus tissue culture, bacterial tissue culture, and AFB tissue culture all negative, but the AFB on the BAL was positive for Mycobacterium, other  cultures otherwise negative.  - Echo report from 6/11/2021 showed a EF of 50%, left ventricular wall thickness consistent with moderate to severe septal asymmetric hypertrophy, left ventricular mass is increased, findings consistent with nonobstructive hypertrophic cardiomyopathy, diastolic heart failure, right atrial cavity moderately dilated, left atrial cavity moderately dilated, RVSP elevated at 55 mmHg.    Assessment / Plan:   Diagnoses and all orders for this visit:    1. Mediastinal lymphadenopathy (Primary)  2. Nodule of upper lobe of left lung  3. Mycobacterium avium complex  -Will plan for repeat CT scan in September or early October 2023.  To confirm stability of the nodule and lymphadenopathy.  I suspect the lymphadenopathy is related to her chronic heart failure.  The left upper lobe nodule was initially stable at 3 months but I want to have a close eye on this.  If it continues to grow then I would recommend we try to biopsy the lesion using robotic bronchoscopy.  I informed her that I will call her with the CT scan results.  If it is stable though I want her to have a follow-up CT scan in 6 months from the next CT scan.  -Discussed the treatments for MAC on today's visit.  But given that she is fairly asymptomatic I do not think we should pursue therapy at this time.  Her cardiac history would also very much complicate the treatment process for MAC.  I suspect that she had some point will need air case.  If there is progression of the disease or she becomes more symptomatic then we can talk about starting therapy.    4. Atrial flutter, unspecified type  -The patient is on anticoagulation for history of a flutter.  This would have to be held before any biopsies were performed.      Follow Up:   Return in about 8 months (around 4/11/2024).       RULA Thompson DO  Pulmonary and Critical Care Medicine  Note Electronically Signed    Part of this note may be an electronic transcription/translation of  spoken language to printed text using the Dragon Dictation System.

## 2023-08-22 NOTE — INTERVAL H&P NOTE
H&P reviewed. The patient was examined and there are no changes to the H&P.    Patient presents for EP study +/- RFA of SVT.  She had had recurrent, symptomatic episodes of what appears to be SVT, likely AVNRT, per device interrogation resulting in inappropriate ICD shocks.  She did have 2 more shocks on Saturday.  No syncope.  The risks, benefits, and alternatives of the procedure have been reviewed and the patient wishes to proceed.       Lab Results   Component Value Date    GLUCOSE 100 09/13/2018    CALCIUM 9.6 09/13/2018     09/13/2018    K 4.0 09/13/2018    CO2 30.0 09/13/2018     09/13/2018    BUN 18 09/13/2018    CREATININE 0.91 09/13/2018    EGFRIFNONA 65 09/13/2018    BCR 19.8 09/13/2018    ANIONGAP 4.0 09/13/2018     Lab Results   Component Value Date    WBC 9.99 09/13/2018    HGB 13.1 09/13/2018    HCT 40.2 09/13/2018    MCV 91.8 09/13/2018     09/13/2018        /77 (BP Location: Left arm, Patient Position: Lying) Comment: 117/72 right  Pulse 61   Temp 97.3 °F (36.3 °C) (Temporal Artery )   Resp 16   SpO2 99%       [facility]  9/13/2018  11:12 AM      I, Maximino Nassar, have reviewed the note in full and agree with all aspects of the above including physical exam, assessment, labs and plan with changes made accordingly. Face to Face Time was spent with the patient.    Maximino Nassar DO  09/13/18  3:37 PM         Called patient and left voicemail. FIRST ATTEMPT.

## 2023-09-06 PROCEDURE — 93296 REM INTERROG EVL PM/IDS: CPT | Performed by: STUDENT IN AN ORGANIZED HEALTH CARE EDUCATION/TRAINING PROGRAM

## 2023-09-06 PROCEDURE — 93295 DEV INTERROG REMOTE 1/2/MLT: CPT | Performed by: STUDENT IN AN ORGANIZED HEALTH CARE EDUCATION/TRAINING PROGRAM

## 2023-09-21 ENCOUNTER — HOSPITAL ENCOUNTER (OUTPATIENT)
Dept: CT IMAGING | Facility: HOSPITAL | Age: 58
Discharge: HOME | End: 2023-09-21
Admitting: INTERNAL MEDICINE
Payer: COMMERCIAL

## 2023-09-21 DIAGNOSIS — R91.1 NODULE OF UPPER LOBE OF LEFT LUNG: ICD-10-CM

## 2023-09-21 PROCEDURE — 71250 CT THORAX DX C-: CPT

## 2023-09-22 DIAGNOSIS — R91.1 NODULE OF UPPER LOBE OF LEFT LUNG: Primary | ICD-10-CM

## 2024-02-12 RX ORDER — APIXABAN 5 MG/1
5 TABLET, FILM COATED ORAL EVERY 12 HOURS
Qty: 180 TABLET | Refills: 2 | Status: SHIPPED | OUTPATIENT
Start: 2024-02-12

## 2024-03-07 RX ORDER — SOTALOL HYDROCHLORIDE 80 MG/1
80 TABLET ORAL EVERY 12 HOURS
Qty: 60 TABLET | Refills: 0 | Status: SHIPPED | OUTPATIENT
Start: 2024-03-07

## 2024-03-22 PROCEDURE — 93295 DEV INTERROG REMOTE 1/2/MLT: CPT | Performed by: STUDENT IN AN ORGANIZED HEALTH CARE EDUCATION/TRAINING PROGRAM

## 2024-03-22 PROCEDURE — 93296 REM INTERROG EVL PM/IDS: CPT | Performed by: STUDENT IN AN ORGANIZED HEALTH CARE EDUCATION/TRAINING PROGRAM

## 2024-03-25 ENCOUNTER — HOSPITAL ENCOUNTER (OUTPATIENT)
Dept: CT IMAGING | Facility: HOSPITAL | Age: 59
Discharge: HOME | End: 2024-03-25
Admitting: INTERNAL MEDICINE
Payer: COMMERCIAL

## 2024-03-25 DIAGNOSIS — R91.1 NODULE OF UPPER LOBE OF LEFT LUNG: ICD-10-CM

## 2024-03-25 PROCEDURE — 71250 CT THORAX DX C-: CPT

## 2024-03-28 ENCOUNTER — OFFICE VISIT (OUTPATIENT)
Dept: CARDIOLOGY | Facility: CLINIC | Age: 59
End: 2024-03-28
Payer: COMMERCIAL

## 2024-03-28 VITALS
DIASTOLIC BLOOD PRESSURE: 78 MMHG | OXYGEN SATURATION: 96 % | SYSTOLIC BLOOD PRESSURE: 102 MMHG | HEART RATE: 76 BPM | BODY MASS INDEX: 26.99 KG/M2 | HEIGHT: 65 IN | WEIGHT: 162 LBS

## 2024-03-28 DIAGNOSIS — Z95.810 ICD (IMPLANTABLE CARDIOVERTER-DEFIBRILLATOR) IN PLACE: ICD-10-CM

## 2024-03-28 DIAGNOSIS — I42.2 HYPERTROPHIC NONOBSTRUCTIVE CARDIOMYOPATHY: Primary | ICD-10-CM

## 2024-03-28 DIAGNOSIS — R06.09 DOE (DYSPNEA ON EXERTION): Primary | ICD-10-CM

## 2024-03-28 DIAGNOSIS — Z79.899 LONG TERM CURRENT USE OF ANTIARRHYTHMIC MEDICAL THERAPY: ICD-10-CM

## 2024-03-28 DIAGNOSIS — I48.3 TYPICAL ATRIAL FLUTTER: ICD-10-CM

## 2024-03-28 DIAGNOSIS — R06.02 SOB (SHORTNESS OF BREATH): ICD-10-CM

## 2024-03-28 DIAGNOSIS — Z79.01 CHRONIC ANTICOAGULATION: ICD-10-CM

## 2024-03-28 PROBLEM — I48.0 PAROXYSMAL ATRIAL FIBRILLATION: Status: ACTIVE | Noted: 2024-03-28

## 2024-03-28 RX ORDER — FUROSEMIDE 20 MG/1
1 TABLET ORAL DAILY
COMMUNITY
Start: 2023-12-01

## 2024-03-28 NOTE — PROGRESS NOTES
Cardiac Electrophysiology Outpatient Note  [facility]    Office Visit     Smiley Sibley  [id number]  03/28/2024    Primary Care Physician: Provider, No Known    Referred By: No ref. provider found    Subjective     Chief Complaint   Patient presents with    Cardiomyopathy       History of Present Illness:   Smiley Sibley is a 58 y.o. female who presents to my electrophysiology clinic for follow up of hypertrophic cardiomyopathy, status post ICD for primary prevention, and atrial flutter status post ablation.   She was last seen in clinic approximately 8 months ago.       She went to [facility] for evaluation of hypertrophic cardiomyopathy.  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  She sees Dr. Kulkarni for her general cardiology needs.  She has been evaluated by Dr. Thompson with pulmonology as well, and was eventually found to have MAC.      She complains that her shortness of breath is still significant and occurs with anything beyond mild physical activity. No chest pain, palpitations, lower extremity edema or syncope.      Past Medical History:   Diagnosis Date    Abnormal ECG 1998    Anxiety and depression 10/21/2016    Arrhythmia     Asthma     Congenital heart disease 1998    Coronary artery disease     Heart murmur 1966    Hyperlipidemia 2018    Hypertrophic obstructive cardiomyopathy 10/21/2016    Hypertrophic obstructive cardiomyopathy     Migraine headache 10/21/2016    Mitral valve prolapse 1998    Neurocardiogenic syncope 10/21/2016    Nonsustained ventricular tachycardia 10/21/2016    Sleep apnea 2013    Valvular disease 1998       Past Surgical History:   Procedure Laterality Date    ABLATION OF DYSRHYTHMIC FOCUS  09/13/2018    BRONCHOSCOPY N/A 3/30/2023    Procedure: BRONCHOSCOPY WITH ENDOBRONCHIAL ULTRASOUND;  Surgeon: Terrence Thompson DO;  Location: [facility];  Service: Pulmonary;  Laterality:  N/A;    CARDIAC CATHETERIZATION  2010    CARDIAC DEFIBRILLATOR PLACEMENT  07/29/2010    CARDIAC ELECTROPHYSIOLOGY PROCEDURE N/A 9/13/2018    Procedure: Ablation SVT;  Surgeon: Maximino Nassar DO;  Location:  [facility];  Service: Cardiovascular    CARDIAC ELECTROPHYSIOLOGY PROCEDURE N/A 7/20/2020    Procedure: ICD DC generator change-  3-4 weeks SJM;  Surgeon: Sanya Stiles MD;  Location:  [facility];  Service: Cardiology;  Laterality: N/A;    COLONOSCOPY      DILATATION AND CURETTAGE      HYSTEROTOMY      INSERT / REPLACE / REMOVE PACEMAKER  07/29/2010       Family History   Problem Relation Age of Onset    Arrhythmia Brother     Heart disease Brother     Arrhythmia Mother     Heart disease Mother     Heart failure Mother     Hyperlipidemia Father     Hypertension Father        Social History     Socioeconomic History    Marital status:    Tobacco Use    Smoking status: Never    Smokeless tobacco: Never   Vaping Use    Vaping status: Never Used   Substance and Sexual Activity    Alcohol use: Not Currently     Alcohol/week: 1.0 - 2.0 standard drink of alcohol     Comment: 3-4 times/year    Drug use: No    Sexual activity: Yes     Partners: Male     Birth control/protection: Surgical         Current Outpatient Medications:     albuterol sulfate  (90 Base) MCG/ACT inhaler, Inhale 2 puffs Every 4 (Four) Hours As Needed for Wheezing., Disp: 18 g, Rfl: 11    apixaban (Eliquis) 5 MG tablet tablet, TAKE 1 TABLET BY MOUTH EVERY 12 HOURS, Disp: 180 tablet, Rfl: 2    furosemide (LASIX) 20 MG tablet, Take 1 tablet by mouth Daily., Disp: , Rfl:     sotalol (BETAPACE) 80 MG tablet, TAKE ONE TABLET BY MOUTH EVERY 12 HOURS, Disp: 60 tablet, Rfl: 0    dapagliflozin Propanediol (Farxiga) 10 MG tablet, Take 10 mg by mouth Daily. (Patient not taking: Reported on 3/28/2024), Disp: , Rfl:     Allergies:   No Known Allergies    Objective   Vital Signs: Blood pressure 102/78, pulse 76,  "height 165.1 cm (65\"), weight 73.5 kg (162 lb), SpO2 96%.    PHYSICAL EXAM  General appearance: Awake, alert, cooperative  Head: Normocephalic, without obvious abnormality, atraumatic  Lungs: Clear to ascultation bilaterally  Heart: Regular rate and rhythm, systolic murmur noted, no lower extremity swelling  Skin: Skin color, turgor normal, no rashes or lesions  Neurologic: Grossly normal     Lab Results   Component Value Date    GLUCOSE 99 03/28/2023    CALCIUM 10.0 03/28/2023     03/28/2023    K 4.4 03/28/2023    CO2 27.0 03/28/2023     03/28/2023    BUN 20 03/28/2023    CREATININE 1.00 06/26/2023    EGFRIFNONA 55 (L) 07/17/2020    BCR 20.4 03/28/2023    ANIONGAP 10.0 03/28/2023     Lab Results   Component Value Date    WBC 8.60 03/28/2023    HGB 15.0 03/28/2023    HCT 47.1 (H) 03/28/2023    MCV 95.7 03/28/2023     03/28/2023     Lab Results   Component Value Date    INR 1.11 03/28/2023    PROTIME 14.3 03/28/2023     No results found for: \"TSH\", \"Z2CNLWM\", \"M2EJICD\", \"THYROIDAB\"       Results for orders placed during the hospital encounter of 06/02/23    Adult Transthoracic Echo Complete w/ Color, Spectral and Contrast if necessary per protocol    Interpretation Summary    Left ventricular systolic function is low normal. Calculated left ventricular EF = 45.1% Left ventricular ejection fraction appears to be 46 - 50%.    Left ventricular wall thickness is consistent with mild concentric hypertrophy.  Moderate sigmoid-shaped ventricular septum is present.    Left ventricular diastolic function is consistent with (grade II w/high LAP) pseudonormalization.    The left atrial cavity is moderately dilated.    Moderate mitral valve regurgitation is present.    Moderate tricuspid valve regurgitation is present.    Estimated right ventricular systolic pressure from tricuspid regurgitation is moderately elevated (45-55 mmHg). Calculated right ventricular systolic pressure from tricuspid regurgitation is " 51 mmHg.    Much regurgitation is slightly increased since prior study in 2021.  EF appears very similar.         I personally viewed and interpreted the patient's EKG/Telemetry/lab data      ECG 12 Lead    Date/Time: 3/29/2024 11:09 AM  Performed by: Ramana Sosa PA-C    Authorized by: Ramana Sosa PA-C  Comparison: compared with previous ECG from 7/27/2023  Comparison to previous ECG: Atrial paced rhythm with intrinsic ventricular beats has replaced dual paced rhythm, T wave abnormality is now present but this has been present and past EKGs that demonstrate intrinsic ventricular conduction  Rhythm comments: Atrial paced rhythm  Rate: normal  BPM: 60  Conduction: non-specific intraventricular conduction delay  QRS axis: right  Other findings: T wave abnormality    Clinical impression: abnormal EKG          Smiley Sibley  reports that she has never smoked. She has never used smokeless tobacco.       Advance Care Planning   Advance Care Planning: ACP discussion was declined by the patient. Patient has an advance directive (not in EMR), copy requested.     Assessment & Plan    1. Hypertrophic nonobstructive cardiomyopathy  She has a history of hypertrophic obstructive cardiomyopathy.  She has been seen in [facility] for evaluation of this.  She has significant elevation in her pulmonary pressures.  She is currently on Farxiga.  I referred her to Dr. Kulkarni as well for additional management.  She continues to have significant short of breath.  She does not have significant gradient, but does have a mildly reduced ejection fraction and significantly elevated pulmonary pressures.    2. ICD (implantable cardioverter-defibrillator) in place  Manual device interrogation in office today demonstrated a [place] dual-chamber pacemaker with 95% atrial pacing, 40% ventricular pacing, 4 years on the battery, acceptable threshold impedance values and some nonsustained RV oversensing for which to COVID-related  threshold start were changed.  See scanned in report for further details.    3. Typical atrial flutter/Paroxysmal Atrial Fibrillation  History of typical atrial flutter status post ablation.  Per records she was noted to have brief runs of atrial fibrillation during that procedure.  She was started on sotalol and has since been maintained on that.      4. Long term current use of antiarrhythmic medical therapy  Manual QTc measurement on EKG today demonstrated acceptable interval.  Continue sotalol    5. Chronic anticoagulation  Continue Eliquis 5 mg twice daily for stroke prophylaxis    6. SOB (shortness of breath)  Patient's shortness of breath is likely multifactorial.  She does have hypertrophic cardiomyopathy but without significant gradient. She has pulmonary hypertension. She also has Mycobacterium Avium Complex and is followed by Dr. Thompson.  The shortness of breath is fairly limiting to her.  She is not pacing 40% the time in the right ventricle up from 30% last check.  If she continues to experience shortness of breath despite addressing other possible contributing factors, we can consider upgrading her device to a CRT ICD as she had mild LV dysfunction with LVEF 45% on echo last year as well as increasing RV pacing burden.  This was discussed with Dr. Paulino who also discussed this with Dr. Kulkarni.  We will proceed with a stress echo to see if she has a significant gradient with her hypertrophic cardiomyopathy under stress.  -Stress echocardiogram, future      Follow Up:  Return in about 6 months (around 9/28/2024).      Thank you for allowing me to participate in the care of your patient. Please do not hesitate to contact me with additional questions or concerns.      Ramana Sosa PA-C  Cardiac Electrophysiology  [facility] / [facility]

## 2024-03-29 RX ORDER — DAPAGLIFLOZIN 10 MG/1
10 TABLET, FILM COATED ORAL DAILY
Qty: 90 TABLET | Refills: 2 | Status: SHIPPED | OUTPATIENT
Start: 2024-03-29

## 2024-04-04 RX ORDER — SOTALOL HYDROCHLORIDE 80 MG/1
80 TABLET ORAL EVERY 12 HOURS
Qty: 60 TABLET | Refills: 8 | Status: SHIPPED | OUTPATIENT
Start: 2024-04-04

## 2024-04-18 ENCOUNTER — HOSPITAL ENCOUNTER (OUTPATIENT)
Dept: CARDIOLOGY | Facility: HOSPITAL | Age: 59
Discharge: HOME | End: 2024-04-18
Admitting: INTERNAL MEDICINE
Payer: COMMERCIAL

## 2024-04-18 VITALS
SYSTOLIC BLOOD PRESSURE: 110 MMHG | HEART RATE: 64 BPM | BODY MASS INDEX: 27 KG/M2 | OXYGEN SATURATION: 97 % | WEIGHT: 162.04 LBS | DIASTOLIC BLOOD PRESSURE: 62 MMHG | HEIGHT: 65 IN

## 2024-04-18 DIAGNOSIS — R06.09 DOE (DYSPNEA ON EXERTION): ICD-10-CM

## 2024-04-18 LAB
BH CV ECHO MEAS - AI P1/2T: 529.8 MSEC
BH CV ECHO MEAS - AO MAX PG: 3.7 MMHG
BH CV ECHO MEAS - AO MEAN PG: 2 MMHG
BH CV ECHO MEAS - AO V2 MAX: 95.9 CM/SEC
BH CV ECHO MEAS - AO V2 VTI: 21.9 CM
BH CV ECHO MEAS - AVA(I,D): 2.9 CM2
BH CV ECHO MEAS - EDV(CUBED): 85.2 ML
BH CV ECHO MEAS - ESV(CUBED): 17.1 ML
BH CV ECHO MEAS - FS: 41.4 %
BH CV ECHO MEAS - IVS/LVPW: 1 CM
BH CV ECHO MEAS - IVSD: 1.1 CM
BH CV ECHO MEAS - LA DIMENSION: 4.6 CM
BH CV ECHO MEAS - LV MASS(C)D: 168.9 GRAMS
BH CV ECHO MEAS - LV MAX PG: 4.4 MMHG
BH CV ECHO MEAS - LV MEAN PG: 2.2 MMHG
BH CV ECHO MEAS - LV V1 MAX: 104.9 CM/SEC
BH CV ECHO MEAS - LV V1 VTI: 20.4 CM
BH CV ECHO MEAS - LVIDD: 4.4 CM
BH CV ECHO MEAS - LVIDS: 2.6 CM
BH CV ECHO MEAS - LVOT AREA: 3.1 CM2
BH CV ECHO MEAS - LVOT DIAM: 1.98 CM
BH CV ECHO MEAS - LVPWD: 1.1 CM
BH CV ECHO MEAS - SV(LVOT): 62.4 ML
BH CV STRESS BP STAGE 1: NORMAL
BH CV STRESS DURATION MIN STAGE 1: 1
BH CV STRESS DURATION SEC STAGE 1: 0
BH CV STRESS GRADE STAGE 1: 10
BH CV STRESS HR STAGE 1: 89
BH CV STRESS METS STAGE 1: 5
BH CV STRESS O2 STAGE 1: 98
BH CV STRESS PROTOCOL 1: NORMAL
BH CV STRESS PROTOCOL 2 BP STAGE 1: NORMAL
BH CV STRESS PROTOCOL 2 BP STAGE 2: NORMAL
BH CV STRESS PROTOCOL 2 BP STAGE 3: 67
BH CV STRESS PROTOCOL 2 DOSE DOBUTAMINE STAGE 1: 5
BH CV STRESS PROTOCOL 2 DOSE DOBUTAMINE STAGE 2: 10
BH CV STRESS PROTOCOL 2 DOSE DOBUTAMINE STAGE 3: 20
BH CV STRESS PROTOCOL 2 DURATION MIN STAGE 1: 2
BH CV STRESS PROTOCOL 2 DURATION MIN STAGE 2: 5
BH CV STRESS PROTOCOL 2 DURATION MIN STAGE 3: 8
BH CV STRESS PROTOCOL 2 DURATION SEC STAGE 1: 0
BH CV STRESS PROTOCOL 2 DURATION SEC STAGE 2: 30
BH CV STRESS PROTOCOL 2 DURATION SEC STAGE 3: 30
BH CV STRESS PROTOCOL 2 HR STAGE 1: 60
BH CV STRESS PROTOCOL 2 HR STAGE 2: 60
BH CV STRESS PROTOCOL 2 HR STAGE 3: NORMAL
BH CV STRESS PROTOCOL 2 O2 STAGE 1: 98
BH CV STRESS PROTOCOL 2 O2 STAGE 2: 97
BH CV STRESS PROTOCOL 2 O2 STAGE 3: 97
BH CV STRESS PROTOCOL 2 RATE STAGE 1: 22
BH CV STRESS PROTOCOL 2 RATE STAGE 2: 44
BH CV STRESS PROTOCOL 2 RATE STAGE 7: 88 ML/HR
BH CV STRESS PROTOCOL 2 STAGE 1: 1
BH CV STRESS PROTOCOL 2 STAGE 2: 2
BH CV STRESS PROTOCOL 2 STAGE 3: 3
BH CV STRESS PROTOCOL 2: NORMAL
BH CV STRESS RECOVERY BP: NORMAL MMHG
BH CV STRESS RECOVERY HR: 60 BPM
BH CV STRESS RECOVERY O2: 98 %
BH CV STRESS SPEED STAGE 1: 1.7
BH CV STRESS STAGE 1: 1
MAXIMAL PREDICTED HEART RATE: 163 BPM
PERCENT MAX PREDICTED HR: 41.1 %
STRESS BASELINE BP: NORMAL MMHG
STRESS BASELINE HR: 60 BPM
STRESS O2 SAT REST: 97 %
STRESS PERCENT HR: 48 %
STRESS POST O2 SAT PEAK: 97 %
STRESS POST PEAK BP: NORMAL MMHG
STRESS POST PEAK HR: 67 BPM
STRESS TARGET HR: 139 BPM

## 2024-04-18 PROCEDURE — 93325 DOPPLER ECHO COLOR FLOW MAPG: CPT

## 2024-04-18 PROCEDURE — 93017 CV STRESS TEST TRACING ONLY: CPT

## 2024-04-18 PROCEDURE — 93321 DOPPLER ECHO F-UP/LMTD STD: CPT

## 2024-04-18 PROCEDURE — 93350 STRESS TTE ONLY: CPT

## 2024-04-18 PROCEDURE — 0 DEXTROSE 5 % SOLUTION 250 ML FLEX CONT: Performed by: INTERNAL MEDICINE

## 2024-04-18 PROCEDURE — 25010000002 DOBUTAMINE 250 MG/20ML SOLUTION 20 ML VIAL: Performed by: INTERNAL MEDICINE

## 2024-04-18 RX ADMIN — DEXTROSE MONOHYDRATE 5 MCG/KG/MIN: 50 INJECTION, SOLUTION INTRAVENOUS at 10:58

## 2024-04-19 NOTE — PROGRESS NOTES
Please inform the patient of their test results.  Please let her know that there was no significant gradient in her heart therefore she does not need any specialized medications at this time. Thank you.

## 2024-05-30 ENCOUNTER — OFFICE VISIT (OUTPATIENT)
Dept: FAMILY MEDICINE CLINIC | Facility: CLINIC | Age: 59
End: 2024-05-30
Payer: COMMERCIAL

## 2024-05-30 VITALS
DIASTOLIC BLOOD PRESSURE: 72 MMHG | WEIGHT: 162 LBS | OXYGEN SATURATION: 99 % | BODY MASS INDEX: 26.99 KG/M2 | HEART RATE: 60 BPM | HEIGHT: 65 IN | SYSTOLIC BLOOD PRESSURE: 118 MMHG

## 2024-05-30 DIAGNOSIS — Z00.00 WELL ADULT EXAM: Primary | ICD-10-CM

## 2024-05-30 DIAGNOSIS — K21.9 GASTROESOPHAGEAL REFLUX DISEASE, UNSPECIFIED WHETHER ESOPHAGITIS PRESENT: ICD-10-CM

## 2024-05-30 DIAGNOSIS — R06.02 SOB (SHORTNESS OF BREATH): ICD-10-CM

## 2024-05-30 DIAGNOSIS — I42.2 HYPERTROPHIC NONOBSTRUCTIVE CARDIOMYOPATHY: ICD-10-CM

## 2024-05-30 DIAGNOSIS — Z11.59 ENCOUNTER FOR HEPATITIS C SCREENING TEST FOR LOW RISK PATIENT: ICD-10-CM

## 2024-05-30 PROCEDURE — 99386 PREV VISIT NEW AGE 40-64: CPT | Performed by: STUDENT IN AN ORGANIZED HEALTH CARE EDUCATION/TRAINING PROGRAM

## 2024-05-30 RX ORDER — OMEPRAZOLE 40 MG/1
40 CAPSULE, DELAYED RELEASE ORAL DAILY
Qty: 90 CAPSULE | Refills: 1 | Status: SHIPPED | OUTPATIENT
Start: 2024-05-30

## 2024-05-30 RX ORDER — SACUBITRIL AND VALSARTAN 24; 26 MG/1; MG/1
TABLET, FILM COATED ORAL
COMMUNITY
Start: 2024-05-17

## 2024-05-30 NOTE — PROGRESS NOTES
"Chief Complaint  Establish Care (Pt has not seen a PCP since 2020 but she does see cardiology.)    Subjective          Smiley Sibley presents to [facility]  History of Present Illness    Patient is here to establish care.     She states that she has been seeing Pulm and cardio. She states that she has a hx of cardiomyopathy. She states that she has a pacemaker/defibrillator. She states that she had neurocardiogenic syncope and cardiomyopathy.  She was told that this is genetic as several in her family have the same.     She is doing well with her medications at this time, but has not started the Farxiga,     Mother with colon cancer. Diagnosed in her 60s. She states that she had a colonoscopy in March.     Objective   Vital Signs:   /72   Pulse 60   Ht 165.1 cm (65\")   Wt 73.5 kg (162 lb)   SpO2 99%   BMI 26.96 kg/m²     Body mass index is 26.96 kg/m².    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   Surgical History: has a past surgical history that includes Dilation and curettage of uterus; Hysterotomy; Cardiac electrophysiology procedure (N/A, 09/13/2018); Ablation of dysrhythmic focus (09/13/2018); Cardiac catheterization (2010); Insert / replace / remove pacemaker (07/29/2010); Cardiac defibrillator placement (07/29/2010); Colonoscopy; Cardiac electrophysiology procedure (N/A, 07/20/2020); and " Bronchoscopy (N/A, 03/30/2023).   Family History: family history includes Arrhythmia in her brother and mother; Cancer in her father and mother; Diabetes in her father; Heart disease in her brother and mother; Heart failure in her mother; Hyperlipidemia in her father; Hypertension in her father.   Social History: reports that she has never smoked. She has never used smokeless tobacco. She reports that she does not currently use alcohol after a past usage of about 1.0 - 2.0 standard drink of alcohol per week. She reports that she does not use drugs.      Current Outpatient Medications:     sacubitril-valsartan (Entresto) 24-26 MG tablet, , Disp: , Rfl:     albuterol sulfate  (90 Base) MCG/ACT inhaler, Inhale 2 puffs Every 4 (Four) Hours As Needed for Wheezing., Disp: 18 g, Rfl: 11    apixaban (Eliquis) 5 MG tablet tablet, TAKE 1 TABLET BY MOUTH EVERY 12 HOURS, Disp: 180 tablet, Rfl: 2    dapagliflozin Propanediol (Farxiga) 10 MG tablet, Take 10 mg by mouth Daily., Disp: 90 tablet, Rfl: 2    furosemide (LASIX) 20 MG tablet, Take 1 tablet by mouth Daily., Disp: , Rfl:     omeprazole (priLOSEC) 40 MG capsule, Take 1 capsule by mouth Daily., Disp: 90 capsule, Rfl: 1    sotalol (BETAPACE) 80 MG tablet, TAKE ONE TABLET BY MOUTH EVERY 12 HOURS, Disp: 60 tablet, Rfl: 8    Allergies: Patient has no known allergies.    Physical Exam  Constitutional:       General: She is not in acute distress.     Appearance: She is not ill-appearing or toxic-appearing.   HENT:      Head: Normocephalic and atraumatic.   Cardiovascular:      Rate and Rhythm: Normal rate and regular rhythm.      Heart sounds: No murmur heard.  Pulmonary:      Effort: Pulmonary effort is normal. No respiratory distress.   Musculoskeletal:      Right lower leg: No edema.      Left lower leg: No edema.   Neurological:      General: No focal deficit present.      Mental Status: She is alert and oriented to person, place, and time.   Psychiatric:         Mood  and Affect: Mood normal.         Thought Content: Thought content normal.          Result Review :                   Assessment and Plan    Diagnoses and all orders for this visit:    1. Well adult exam (Primary)  -     Comprehensive Metabolic Panel  -     CBC & Differential  -     Hemoglobin A1c  -     Lipid Panel    2. Hypertrophic nonobstructive cardiomyopathy  -     TSH  -     T4, Free    3. SOB (shortness of breath)    4. Gastroesophageal reflux disease, unspecified whether esophagitis present    5. Encounter for hepatitis C screening test for low risk patient  -     Hepatitis C antibody    Other orders  -     omeprazole (priLOSEC) 40 MG capsule; Take 1 capsule by mouth Daily.  Dispense: 90 capsule; Refill: 1    Blood work ordered and will contact with results when available. Will adjust medications based on abnormalities seen.     Discussed with patient that we will do omeprazole 40 mg daily to help with likely reflux disease and possible peptic ulcer disease.  Follow-up in 6 to 8 weeks or sooner with any new or worsening symptoms    Past medical and surgical history as well as allergies, family history and social history were reviewed, and discussed with patient.  Chronic conditions were reviewed as well as medications.   Anticipatory guidance handouts including healthy diet, health maintenance, as well as regular exercise and general instructions were given via [employer], and patient was able to ask questions and discuss any concerns.        Follow Up   No follow-ups on file.  Patient was given instructions and counseling regarding her condition or for health maintenance advice. Please see specific information pulled into the AVS if appropriate.     Coral Granda, DO

## 2024-06-07 ENCOUNTER — LAB (OUTPATIENT)
Dept: FAMILY MEDICINE CLINIC | Facility: CLINIC | Age: 59
End: 2024-06-07
Payer: COMMERCIAL

## 2024-06-08 LAB
ALBUMIN SERPL-MCNC: 4.3 G/DL (ref 3.8–4.9)
ALBUMIN/GLOB SERPL: 1.8 {RATIO} (ref 1.2–2.2)
ALP SERPL-CCNC: 104 IU/L (ref 44–121)
ALT SERPL-CCNC: 39 IU/L (ref 0–32)
AST SERPL-CCNC: 41 IU/L (ref 0–40)
BASOPHILS # BLD AUTO: 0 X10E3/UL (ref 0–0.2)
BASOPHILS NFR BLD AUTO: 1 %
BILIRUB SERPL-MCNC: 0.9 MG/DL (ref 0–1.2)
BUN SERPL-MCNC: 21 MG/DL (ref 6–24)
BUN/CREAT SERPL: 19 (ref 9–23)
CALCIUM SERPL-MCNC: 9.8 MG/DL (ref 8.7–10.2)
CHLORIDE SERPL-SCNC: 104 MMOL/L (ref 96–106)
CHOLEST SERPL-MCNC: 159 MG/DL (ref 100–199)
CO2 SERPL-SCNC: 20 MMOL/L (ref 20–29)
CREAT SERPL-MCNC: 1.1 MG/DL (ref 0.57–1)
EGFRCR SERPLBLD CKD-EPI 2021: 59 ML/MIN/1.73
EOSINOPHIL # BLD AUTO: 0.2 X10E3/UL (ref 0–0.4)
EOSINOPHIL NFR BLD AUTO: 2 %
ERYTHROCYTE [DISTWIDTH] IN BLOOD BY AUTOMATED COUNT: 12.2 % (ref 11.7–15.4)
GLOBULIN SER CALC-MCNC: 2.4 G/DL (ref 1.5–4.5)
GLUCOSE SERPL-MCNC: 117 MG/DL (ref 70–99)
HBA1C MFR BLD: 6.3 % (ref 4.8–5.6)
HCT VFR BLD AUTO: 49.9 % (ref 34–46.6)
HCV IGG SERPL QL IA: NON REACTIVE
HDLC SERPL-MCNC: 36 MG/DL
HGB BLD-MCNC: 16.5 G/DL (ref 11.1–15.9)
IMM GRANULOCYTES # BLD AUTO: 0 X10E3/UL (ref 0–0.1)
IMM GRANULOCYTES NFR BLD AUTO: 0 %
LDLC SERPL CALC-MCNC: 100 MG/DL (ref 0–99)
LYMPHOCYTES # BLD AUTO: 1.8 X10E3/UL (ref 0.7–3.1)
LYMPHOCYTES NFR BLD AUTO: 25 %
MCH RBC QN AUTO: 31.4 PG (ref 26.6–33)
MCHC RBC AUTO-ENTMCNC: 33.1 G/DL (ref 31.5–35.7)
MCV RBC AUTO: 95 FL (ref 79–97)
MONOCYTES # BLD AUTO: 0.6 X10E3/UL (ref 0.1–0.9)
MONOCYTES NFR BLD AUTO: 8 %
NEUTROPHILS # BLD AUTO: 4.6 X10E3/UL (ref 1.4–7)
NEUTROPHILS NFR BLD AUTO: 64 %
PLATELET # BLD AUTO: 284 X10E3/UL (ref 150–450)
POTASSIUM SERPL-SCNC: 5.3 MMOL/L (ref 3.5–5.2)
PROT SERPL-MCNC: 6.7 G/DL (ref 6–8.5)
RBC # BLD AUTO: 5.25 X10E6/UL (ref 3.77–5.28)
SODIUM SERPL-SCNC: 139 MMOL/L (ref 134–144)
T4 FREE SERPL-MCNC: 1.04 NG/DL (ref 0.82–1.77)
TRIGL SERPL-MCNC: 128 MG/DL (ref 0–149)
TSH SERPL DL<=0.005 MIU/L-ACNC: 4.93 UIU/ML (ref 0.45–4.5)
VLDLC SERPL CALC-MCNC: 23 MG/DL (ref 5–40)
WBC # BLD AUTO: 7.1 X10E3/UL (ref 3.4–10.8)

## 2024-06-28 ENCOUNTER — TELEPHONE (OUTPATIENT)
Dept: FAMILY MEDICINE CLINIC | Facility: CLINIC | Age: 59
End: 2024-06-28

## 2024-06-28 NOTE — TELEPHONE ENCOUNTER
Caller: Smiley Sibley    Relationship to patient: Self    Best call back number: [phone]     New or established patient?  [x] New  [] Established    Date of discharge: 06.25.24    Facility discharged from: Located in: [facility]  Address: [facility] [address], [place], [place] [address]  Phone: [phone]    Diagnosis/Symptoms: VTAC     Length of stay (If applicable):     Specialty Only: Did you see a [facility] provider?    [] Yes  [x] No  If so, who?

## 2024-07-02 ENCOUNTER — OFFICE VISIT (OUTPATIENT)
Dept: FAMILY MEDICINE CLINIC | Facility: CLINIC | Age: 59
End: 2024-07-02
Payer: COMMERCIAL

## 2024-07-02 ENCOUNTER — TELEPHONE (OUTPATIENT)
Dept: FAMILY MEDICINE CLINIC | Facility: CLINIC | Age: 59
End: 2024-07-02

## 2024-07-02 VITALS
OXYGEN SATURATION: 98 % | DIASTOLIC BLOOD PRESSURE: 86 MMHG | BODY MASS INDEX: 25.81 KG/M2 | HEART RATE: 68 BPM | SYSTOLIC BLOOD PRESSURE: 122 MMHG | HEIGHT: 65 IN | WEIGHT: 154.9 LBS

## 2024-07-02 DIAGNOSIS — I47.20 SUSTAINED VT (VENTRICULAR TACHYCARDIA): Primary | ICD-10-CM

## 2024-07-02 DIAGNOSIS — I42.2 HYPERTROPHIC NONOBSTRUCTIVE CARDIOMYOPATHY: ICD-10-CM

## 2024-07-02 PROCEDURE — 99214 OFFICE O/P EST MOD 30 MIN: CPT | Performed by: STUDENT IN AN ORGANIZED HEALTH CARE EDUCATION/TRAINING PROGRAM

## 2024-07-02 RX ORDER — AMIODARONE HYDROCHLORIDE 200 MG/1
200 TABLET ORAL DAILY
Qty: 14 TABLET | Refills: 0 | Status: SHIPPED | OUTPATIENT
Start: 2024-07-02

## 2024-07-02 RX ORDER — AMIODARONE HYDROCHLORIDE 200 MG/1
200 TABLET ORAL DAILY
COMMUNITY
End: 2024-07-02 | Stop reason: SDUPTHER

## 2024-07-02 NOTE — PROGRESS NOTES
"Transitional Care Follow Up Visit  Subjective     Smiley Sibley is a 58 y.o. female who presents for a transitional care management visit.     Current outpatient and discharge medications have been reconciled for the patient.  Reviewed by: Coral Granda DO    History of Present Illness    Patient states that about 2 weeks ago she was taken to the Ed in [facility] for palpitations. She was found to be in Vtach. She was treated with Medications, and was advised of ablation, but she wanted to follow up with her Cardiologist at [facility]. She states that she has a follow up with them next week.     Patient states that she has been doing much better at this time. She states that she feels better since losing some water weight and she had improvement in her breathing, swelling, She states that she has had some feeling of her heart beating in her neck, but overall states that she is feeling considerably better than she did previously.       Objective   Vital Signs:   /86   Pulse 68   Ht 165.1 cm (65\")   Wt 70.3 kg (154 lb 14.4 oz)   SpO2 98%   BMI 25.78 kg/m²     Body mass index is 25.78 kg/m².    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   Surgical History: has a past surgical history that includes Dilation and curettage of uterus; Hysterotomy; Cardiac electrophysiology procedure (N/A, 09/13/2018); Ablation of dysrhythmic focus " (09/13/2018); Cardiac catheterization (2010); Insert / replace / remove pacemaker (07/29/2010); Cardiac defibrillator placement (07/29/2010); Colonoscopy; Cardiac electrophysiology procedure (N/A, 07/20/2020); and Bronchoscopy (N/A, 03/30/2023).   Family History: family history includes Arrhythmia in her brother and mother; Cancer in her father and mother; Diabetes in her father; Heart disease in her brother and mother; Heart failure in her mother; Hyperlipidemia in her father; Hypertension in her father.   Social History: reports that she has never smoked. She has never used smokeless tobacco. She reports that she does not currently use alcohol after a past usage of about 1.0 - 2.0 standard drink of alcohol per week. She reports that she does not use drugs.      Current Outpatient Medications:     albuterol sulfate  (90 Base) MCG/ACT inhaler, Inhale 2 puffs Every 4 (Four) Hours As Needed for Wheezing., Disp: 18 g, Rfl: 11    amiodarone (PACERONE) 200 MG tablet, Take 1 tablet by mouth Daily., Disp: , Rfl:     apixaban (Eliquis) 5 MG tablet tablet, TAKE 1 TABLET BY MOUTH EVERY 12 HOURS, Disp: 180 tablet, Rfl: 2    furosemide (LASIX) 20 MG tablet, Take 1 tablet by mouth Daily., Disp: , Rfl:     omeprazole (priLOSEC) 40 MG capsule, Take 1 capsule by mouth Daily., Disp: 90 capsule, Rfl: 1    sacubitril-valsartan (Entresto) 24-26 MG tablet, , Disp: , Rfl:     Allergies: Patient has no known allergies.    Physical Exam  Constitutional:       General: She is not in acute distress.     Appearance: She is not ill-appearing or toxic-appearing.   HENT:      Head: Normocephalic and atraumatic.   Cardiovascular:      Rate and Rhythm: Normal rate and regular rhythm.      Heart sounds: No murmur heard.     Comments: Soft heart sounds, but otherwise normal.  Pulmonary:      Effort: Pulmonary effort is normal. No respiratory distress.   Musculoskeletal:      Right lower leg: No edema.      Left lower leg: No edema.    Neurological:      General: No focal deficit present.      Mental Status: She is alert and oriented to person, place, and time.   Psychiatric:         Mood and Affect: Mood normal.         Thought Content: Thought content normal.          Result Review :   The following data was reviewed by: Coral Granda DO on 07/02/2024:    Data reviewed : Hospital Records from [facility] admission, and discharge.Reviewed patient information packet from discharge as well as imaging and blood work in the past.             Assessment and Plan    Diagnoses and all orders for this visit:    1. Sustained VT (ventricular tachycardia) (Primary)    2. Hypertrophic nonobstructive cardiomyopathy    Patient overall doing well at this time. She has appropriate follow up at this time. She states that she has not had any worsening of her symptoms since being home.     Patient is needing a refill of amiodarone until she follows up with cardiology.  Will send 2 weeks for her to cover her until she sees cardiology at [facility].    Patient to follow-up in about 6 to 8 weeks or sooner with any new or worsening symptoms for recheck and repeat blood work.        Follow Up   No follow-ups on file.  Patient was given instructions and counseling regarding her condition or for health maintenance advice. Please see specific information pulled into the AVS if appropriate.     Coral Granda DO

## 2024-07-02 NOTE — TELEPHONE ENCOUNTER
[facility]  Address: [address], [place] [address]  Phone: [phone]         RECORD REQUESTED VIA PHONE/WILL FAX STAT

## 2024-07-03 ENCOUNTER — TELEPHONE (OUTPATIENT)
Dept: FAMILY MEDICINE CLINIC | Facility: CLINIC | Age: 59
End: 2024-07-03
Payer: COMMERCIAL

## 2024-07-03 NOTE — TELEPHONE ENCOUNTER
Pharmacy Name: [facility] [id number] [address]  - [phone] [facility] [phone]      Pharmacy representative name: AMBER     Pharmacy representative phone number: [phone]     What medication are you calling in regards to: amiodarone (PACERONE) 200 MG tablet     What question does the pharmacy have: AMBER IS WANTING TO KNOW IF THE PATIENT IS TO STOP THE: SOTALOL 80 MG

## 2024-08-05 DIAGNOSIS — I47.20 SUSTAINED VT (VENTRICULAR TACHYCARDIA): ICD-10-CM

## 2024-08-07 RX ORDER — AMIODARONE HYDROCHLORIDE 200 MG/1
200 TABLET ORAL DAILY
Qty: 14 TABLET | Refills: 0 | OUTPATIENT
Start: 2024-08-07

## 2024-08-15 DIAGNOSIS — I47.20 SUSTAINED VT (VENTRICULAR TACHYCARDIA): ICD-10-CM

## 2024-08-15 RX ORDER — AMIODARONE HYDROCHLORIDE 200 MG/1
200 TABLET ORAL DAILY
Qty: 14 TABLET | Refills: 0 | Status: SHIPPED | OUTPATIENT
Start: 2024-08-15

## 2024-08-15 NOTE — TELEPHONE ENCOUNTER
Pt is trying to go through [facility] to get refill but is leaving for a road trip on Sat. Pt would like short term fill to get her through until [facility] fills medication.

## 2024-08-15 NOTE — TELEPHONE ENCOUNTER
Please see if patient has gotten this from Cardiology. I told her that it was a short term fill only. Thanks.

## 2024-08-15 NOTE — TELEPHONE ENCOUNTER
Rx Refill Note  Requested Prescriptions     Pending Prescriptions Disp Refills    amiodarone (PACERONE) 200 MG tablet [Pharmacy Med Name: AMIODARONE  MG TABLET] 14 tablet 0     Sig: Take 1 tablet by mouth Daily.      Last office visit with prescribing clinician: 7/2/2024   Last telemedicine visit with prescribing clinician: Visit date not found   Next office visit with prescribing clinician: 9/9/2024                         Would you like a call back once the refill request has been completed: [] Yes [] No    If the office needs to give you a call back, can they leave a voicemail: [] Yes [] No    Selene Johnson MA  08/15/24, 08:42 EDT

## 2024-08-26 DIAGNOSIS — I47.20 SUSTAINED VT (VENTRICULAR TACHYCARDIA): ICD-10-CM

## 2024-08-26 RX ORDER — AMIODARONE HYDROCHLORIDE 200 MG/1
200 TABLET ORAL DAILY
Qty: 14 TABLET | Refills: 0 | OUTPATIENT
Start: 2024-08-26

## 2024-09-09 ENCOUNTER — OFFICE VISIT (OUTPATIENT)
Dept: FAMILY MEDICINE CLINIC | Facility: CLINIC | Age: 59
End: 2024-09-09
Payer: COMMERCIAL

## 2024-09-09 VITALS
DIASTOLIC BLOOD PRESSURE: 72 MMHG | HEART RATE: 70 BPM | BODY MASS INDEX: 25.16 KG/M2 | OXYGEN SATURATION: 100 % | WEIGHT: 151 LBS | HEIGHT: 65 IN | SYSTOLIC BLOOD PRESSURE: 112 MMHG

## 2024-09-09 DIAGNOSIS — K21.9 GASTROESOPHAGEAL REFLUX DISEASE, UNSPECIFIED WHETHER ESOPHAGITIS PRESENT: Primary | ICD-10-CM

## 2024-09-09 DIAGNOSIS — L98.9 SKIN LESION: ICD-10-CM

## 2024-09-09 PROCEDURE — 99213 OFFICE O/P EST LOW 20 MIN: CPT | Performed by: STUDENT IN AN ORGANIZED HEALTH CARE EDUCATION/TRAINING PROGRAM

## 2024-09-09 RX ORDER — DAPAGLIFLOZIN 10 MG/1
TABLET, FILM COATED ORAL
COMMUNITY
Start: 2024-07-11

## 2024-09-09 NOTE — PROGRESS NOTES
"Chief Complaint  No chief complaint on file.    Subjective          Smiley Sibley presents to [facility]  History of Present Illness    Patient reports to clinic for follow-up on omeprazole for stomach pain.  Patient states that her stomach issues have resolved.  Patient denies any other physical concerns today.    Patient points out a spot on inside of left ankle that is dark, raised and hard.hard- approx 1-2 mm in size. Patient reports having done her annual screening with dermatology last spring and the spot presented after the dermatology appointment. Patient sees the dermatologist annually.      Objective   Vital Signs:   /72   Pulse 70   Ht 165.1 cm (65\")   Wt 68.5 kg (151 lb)   SpO2 100%   BMI 25.13 kg/m²     Body mass index is 25.13 kg/m².    Review of Systems   Constitutional:  Negative for fever and unexpected weight loss.   Gastrointestinal:  Negative for constipation, diarrhea, nausea and vomiting.   Genitourinary:  Negative for dysuria, frequency and hematuria.   Musculoskeletal:  Negative for arthralgias and myalgias.   Skin:  Positive for wound.        Left inner ankle- few months   Allergic/Immunologic: Negative.    Neurological: Negative.    Psychiatric/Behavioral: Negative.         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   Surgical History: has a past surgical history that includes Dilation and curettage " of uterus; Hysterotomy; Cardiac electrophysiology procedure (N/A, 09/13/2018); Ablation of dysrhythmic focus (09/13/2018); Cardiac catheterization (2010); Insert / replace / remove pacemaker (07/29/2010); Cardiac defibrillator placement (07/29/2010); Colonoscopy; Cardiac electrophysiology procedure (N/A, 07/20/2020); and Bronchoscopy (N/A, 03/30/2023).   Family History: family history includes Arrhythmia in her brother and mother; Cancer in her father and mother; Diabetes in her father; Heart disease in her brother and mother; Heart failure in her mother; Hyperlipidemia in her father; Hypertension in her father.   Social History: reports that she has never smoked. She has never used smokeless tobacco. She reports that she does not currently use alcohol after a past usage of about 1.0 - 2.0 standard drink of alcohol per week. She reports that she does not use drugs.      Current Outpatient Medications:     Farxiga 10 MG tablet, , Disp: , Rfl:     albuterol sulfate  (90 Base) MCG/ACT inhaler, Inhale 2 puffs Every 4 (Four) Hours As Needed for Wheezing., Disp: 18 g, Rfl: 11    amiodarone (PACERONE) 200 MG tablet, TAKE 1 TABLET BY MOUTH DAILY, Disp: 14 tablet, Rfl: 0    apixaban (Eliquis) 5 MG tablet tablet, TAKE 1 TABLET BY MOUTH EVERY 12 HOURS, Disp: 180 tablet, Rfl: 2    furosemide (LASIX) 20 MG tablet, Take 1 tablet by mouth Daily., Disp: , Rfl:     omeprazole (priLOSEC) 40 MG capsule, Take 1 capsule by mouth Daily., Disp: 90 capsule, Rfl: 1    sacubitril-valsartan (Entresto) 24-26 MG tablet, , Disp: , Rfl:     Allergies: Patient has no known allergies.    Physical Exam  Constitutional:       Appearance: Normal appearance. She is normal weight.   HENT:      Head: Normocephalic and atraumatic.      Mouth/Throat:      Mouth: Mucous membranes are moist.   Eyes:      Pupils: Pupils are equal, round, and reactive to light.   Cardiovascular:      Rate and Rhythm: Normal rate and regular rhythm.   Pulmonary:       Effort: Pulmonary effort is normal.      Breath sounds: Normal breath sounds.   Musculoskeletal:      Cervical back: Normal range of motion and neck supple.   Skin:     General: Skin is warm and dry.      Capillary Refill: Capillary refill takes less than 2 seconds.   Neurological:      Mental Status: She is alert.   Psychiatric:         Mood and Affect: Mood normal.         Behavior: Behavior normal.         Thought Content: Thought content normal.         Judgment: Judgment normal.         Result Review :                   Assessment and Plan    Diagnoses and all orders for this visit:    1. Gastroesophageal reflux disease, unspecified whether esophagitis present (Primary)    2. Skin lesion      Assessment of patient today was all negative with the exception of a small 1 mm circular raised dark spot on the inner aspect of her left ankle.  Patient states that she did not know of getting scratched or bitten by anything.  The patient says that the spot has been on her ankle for approximately 3 months.  Patient encouraged to watch spot on ankle and make an appointment with dermatologist if it has not resolved in 6 months time.    Ok to DC Omeprazole at this time. If she has recurrence of symptoms she should complete 6 month plan and follow up at that time for well visit.     She is agreeable to this.     Follow Up     Patient was given instructions and counseling regarding her condition or for health maintenance advice. Please see specific information pulled into the AVS if appropriate.     Coral Granda, DO  Answers submitted by the patient for this visit:  Primary Reason for Visit (Submitted on 9/8/2024)  What is the primary reason for your visit?: Abdominal Pain

## 2024-09-19 DIAGNOSIS — I47.20 SUSTAINED VT (VENTRICULAR TACHYCARDIA): ICD-10-CM

## 2024-09-20 RX ORDER — AMIODARONE HYDROCHLORIDE 200 MG/1
200 TABLET ORAL DAILY
Qty: 14 TABLET | Refills: 0 | OUTPATIENT
Start: 2024-09-20

## 2024-09-26 ENCOUNTER — TELEPHONE (OUTPATIENT)
Dept: CARDIOLOGY | Facility: CLINIC | Age: 59
End: 2024-09-26
Payer: COMMERCIAL

## 2024-10-24 ENCOUNTER — OFFICE VISIT (OUTPATIENT)
Dept: CARDIOLOGY | Facility: CLINIC | Age: 59
End: 2024-10-24
Payer: COMMERCIAL

## 2024-10-24 VITALS
DIASTOLIC BLOOD PRESSURE: 76 MMHG | BODY MASS INDEX: 25.12 KG/M2 | WEIGHT: 150.8 LBS | HEART RATE: 87 BPM | HEIGHT: 65 IN | SYSTOLIC BLOOD PRESSURE: 102 MMHG | OXYGEN SATURATION: 98 %

## 2024-10-24 DIAGNOSIS — Z95.810 ICD (IMPLANTABLE CARDIOVERTER-DEFIBRILLATOR) IN PLACE: ICD-10-CM

## 2024-10-24 DIAGNOSIS — I48.0 PAROXYSMAL ATRIAL FIBRILLATION: ICD-10-CM

## 2024-10-24 DIAGNOSIS — I48.3 TYPICAL ATRIAL FLUTTER: ICD-10-CM

## 2024-10-24 DIAGNOSIS — I42.2 HYPERTROPHIC NONOBSTRUCTIVE CARDIOMYOPATHY: Primary | ICD-10-CM

## 2024-10-24 RX ORDER — METOPROLOL SUCCINATE 25 MG/1
12.5 TABLET, EXTENDED RELEASE ORAL DAILY
COMMUNITY
Start: 2024-09-17

## 2024-10-24 NOTE — PROGRESS NOTES
Smiley Sibley  1965  [phone]    10/24/2024    [facility] CARDIOLOGY     Referring Provider: No ref. provider found     Roca[address]Coral ALLYDO  [place] [facility] [id number]    Chief Complaint   Patient presents with    Hypertrophic nonobstructive cardiomyopathy       Patient Active Problem List   Diagnosis    Nonsustained ventricular tachycardia    Neurocardiogenic syncope    Hypertrophic nonobstructive cardiomyopathy    Anxiety and depression    Migraine headache    ICD (implantable cardioverter-defibrillator) in place    SVT (supraventricular tachycardia)    Long term current use of antiarrhythmic medical therapy    Acute on chronic diastolic heart failure    Atrial flutter    Nonrheumatic mitral valve regurgitation    Nonrheumatic tricuspid valve regurgitation    SOB (shortness of breath)    Mediastinal lymphadenopathy    Mycobacterium avium complex    Nodule of upper lobe of left lung    Paroxysmal atrial fibrillation    Chronic anticoagulation         Allergies  No Known Allergies    Current Medications    Current Outpatient Medications:     albuterol sulfate  (90 Base) MCG/ACT inhaler, Inhale 2 puffs Every 4 (Four) Hours As Needed for Wheezing., Disp: 18 g, Rfl: 11    amiodarone (PACERONE) 200 MG tablet, TAKE 1 TABLET BY MOUTH DAILY, Disp: 14 tablet, Rfl: 0    apixaban (Eliquis) 5 MG tablet tablet, TAKE 1 TABLET BY MOUTH EVERY 12 HOURS, Disp: 180 tablet, Rfl: 2    Farxiga 10 MG tablet, , Disp: , Rfl:     furosemide (LASIX) 20 MG tablet, Take 1 tablet by mouth Daily., Disp: , Rfl:     metoprolol succinate XL (TOPROL-XL) 25 MG 24 hr tablet, Take 0.5 tablets by mouth Daily., Disp: , Rfl:     omeprazole (priLOSEC) 40 MG capsule, Take 1 capsule by mouth Daily., Disp: 90 capsule, Rfl: 1    sacubitril-valsartan (Entresto) 24-26 MG tablet, Take 1 tablet by mouth Daily., Disp: , Rfl:     History of Present Illness     Pt presents for follow up of HOC s/p [facility] ID  "ICD, typical atrial flutter and PAF. She went to a hospital in [place] for SOB, swelling and tachycardia in June 2024. They stopped Sotalol and switched to Amiodarone. Within 2 weeks she was feeling significantly better and was able to go hiking without any SOB. She started on Metoprolol XL 8/2024 and has noticed again beginning to feel SOB with exertional activity. She was also started on daily lasix and swelling has improved significantly. She reports mild dizziness/LH with position changes only. No CP. Denies any bleeding, or TIA/CVA symptoms. BP is well controlled.     ROS:  General:  Denies fatigue, weight gain or loss  Cardiovascular:  Denies CP, PND, syncope, near syncope, edema or palpitations.  Pulmonary:  Denies CONN, cough, or wheezing      Vitals:    10/24/24 0939   BP: 102/76   BP Location: Right arm   Patient Position: Sitting   Pulse: 87   SpO2: 98%   Weight: 68.4 kg (150 lb 12.8 oz)   Height: 165.1 cm (65\")     Body mass index is 25.09 kg/m².  PE:  General: NAD  Neck: no JVD, no carotid bruits, no TM  Heart paced, NL S1, S2, S4 present, no rubs, murmurs  Lungs: CTA, no wheezes, rhonchi, or rales  Abd: soft, non-tender, NL BS  Ext: No musculoskeletal deformities, no edema, cyanosis, or clubbing  Psych: normal mood and affect    Diagnostic Data:        ECG 12 Lead    Date/Time: 10/24/2024 10:28 AM  Performed by: Jessica Yeboah APRN    Authorized by: Jessica Yeboah APRN  Comparison: compared with previous ECG from 3/29/2024  Rhythm: paced  Rate: normal  BPM: 60              1. Hypertrophic nonobstructive cardiomyopathy    2. ICD (implantable cardioverter-defibrillator) in place    3. Typical atrial flutter    4. Paroxysmal atrial fibrillation        Plan:  1. Hypertrophic nonobstructive cardiomyopathy  She has a history of hypertrophic obstructive cardiomyopathy.  She has been seen in [facility] for evaluation of this. Dr. Kulkarni following as well for additional management.  " SOB has improved significantly since stopping sotalol and initiating amiodarone. However she has noted slight increase in SOB in the last couple months since starting Toprol XL 12.5 mg daily.       2. ICD (implantable cardioverter-defibrillator) in place  Manual device interrogation in office today demonstrated a [employer] dual-chamber pacemaker with 78% atrial pacing, 45% ventricular pacing, 4 years on the battery, acceptable threshold impedance values. No events. See scanned in report for further details.     3. Typical atrial flutter/Paroxysmal Atrial Fibrillation  -History of typical atrial flutter status post ablation.  Per records she was noted to have brief runs of atrial fibrillation during that procedure.   -Sotalol discontinued 6/2024 due to SOB and episode of afib at hospital in [place] and initiated on Amiodarone. SOB improved since switching medications. Continue Amiodarone for now     4. Chronic anticoagulation  Continue Eliquis 5 mg twice daily for stroke prophylaxis     5. SOB (shortness of breath)  -Likely multifactorial related to hypertrophic cardiomyopathy but without significant gradient, mild pulmonary hypertension, and Mycobacterium Avium Complex for which she is followed by Dr. Thompson.  SOB improved significantly with medication change from Sotalol to Amiodarone. However she has had increase in SOB again with initiation of Metoprolol XL recently.   -GDMT for HFrEF: Dapagliflozin, lasix. Entresto added 5/2024. Toprol XL added 8/2024  -Stress echocardiogram 4/2024: Resting echo findings unchanged from previous. No significant LVOT gradient with provocation. Moderate MR. Left ventricular wall thickness is consistent with mild asymmetric hypertrophy. Sigmoid-shaped ventricular septum is present. LVEF 46-50%.  -CT chest 9/2023 demonstrated: 1.Multiple bilateral pulmonary nodules and mild mediastinal lymphadenopathy appears unchanged from prior exam. Recommend continued follow-up with CT  versus evaluation with PET/CT. 2.No acute cardiopulmonary process.   -Recent [facility] heart failure consultation 7/2024: recommending stopping amiodarone and she is considering PVA. She has an upcoming appointment with [facility] to further discuss these options. We will follow up after that appointment.      Discussed all of this with Dr. Paulino. Will follow up in 6 months    Electronically signed by DREW Robles, 10/24/24, 10:25 AM EDT.

## 2024-11-11 RX ORDER — APIXABAN 5 MG/1
5 TABLET, FILM COATED ORAL EVERY 12 HOURS
Qty: 180 TABLET | Refills: 2 | Status: SHIPPED | OUTPATIENT
Start: 2024-11-11

## 2024-11-25 RX ORDER — OMEPRAZOLE 40 MG/1
40 CAPSULE, DELAYED RELEASE ORAL DAILY
Qty: 90 CAPSULE | Refills: 1 | Status: SHIPPED | OUTPATIENT
Start: 2024-11-25

## 2025-03-12 ENCOUNTER — OFFICE VISIT (OUTPATIENT)
Dept: FAMILY MEDICINE CLINIC | Facility: CLINIC | Age: 60
End: 2025-03-12
Payer: COMMERCIAL

## 2025-03-12 VITALS
DIASTOLIC BLOOD PRESSURE: 60 MMHG | WEIGHT: 156 LBS | BODY MASS INDEX: 25.99 KG/M2 | OXYGEN SATURATION: 100 % | HEIGHT: 65 IN | HEART RATE: 77 BPM | SYSTOLIC BLOOD PRESSURE: 106 MMHG

## 2025-03-12 DIAGNOSIS — E78.2 MIXED HYPERLIPIDEMIA: ICD-10-CM

## 2025-03-12 DIAGNOSIS — Z00.00 WELL ADULT EXAM: Primary | ICD-10-CM

## 2025-03-12 DIAGNOSIS — K21.9 GASTROESOPHAGEAL REFLUX DISEASE, UNSPECIFIED WHETHER ESOPHAGITIS PRESENT: ICD-10-CM

## 2025-03-12 DIAGNOSIS — I42.2 HYPERTROPHIC NONOBSTRUCTIVE CARDIOMYOPATHY: ICD-10-CM

## 2025-03-12 DIAGNOSIS — I47.20 SUSTAINED VT (VENTRICULAR TACHYCARDIA): ICD-10-CM

## 2025-03-12 DIAGNOSIS — R73.09 ELEVATED GLUCOSE: ICD-10-CM

## 2025-03-12 DIAGNOSIS — R79.89 ELEVATED TSH: ICD-10-CM

## 2025-03-13 LAB
ALBUMIN SERPL-MCNC: 4.7 G/DL (ref 3.8–4.9)
ALP SERPL-CCNC: 121 IU/L (ref 44–121)
ALT SERPL-CCNC: 26 IU/L (ref 0–32)
AST SERPL-CCNC: 28 IU/L (ref 0–40)
BASOPHILS # BLD AUTO: 0.1 X10E3/UL (ref 0–0.2)
BASOPHILS NFR BLD AUTO: 1 %
BILIRUB SERPL-MCNC: 0.5 MG/DL (ref 0–1.2)
BUN SERPL-MCNC: 28 MG/DL (ref 6–24)
BUN/CREAT SERPL: 21 (ref 9–23)
CALCIUM SERPL-MCNC: 9.9 MG/DL (ref 8.7–10.2)
CHLORIDE SERPL-SCNC: 100 MMOL/L (ref 96–106)
CHOLEST SERPL-MCNC: 217 MG/DL (ref 100–199)
CO2 SERPL-SCNC: 26 MMOL/L (ref 20–29)
CREAT SERPL-MCNC: 1.34 MG/DL (ref 0.57–1)
EGFRCR SERPLBLD CKD-EPI 2021: 46 ML/MIN/1.73
EOSINOPHIL # BLD AUTO: 0.1 X10E3/UL (ref 0–0.4)
EOSINOPHIL NFR BLD AUTO: 2 %
ERYTHROCYTE [DISTWIDTH] IN BLOOD BY AUTOMATED COUNT: 12.7 % (ref 11.7–15.4)
GLOBULIN SER CALC-MCNC: 3.1 G/DL (ref 1.5–4.5)
GLUCOSE SERPL-MCNC: 86 MG/DL (ref 70–99)
HBA1C MFR BLD: 5.9 % (ref 4.8–5.6)
HCT VFR BLD AUTO: 46.6 % (ref 34–46.6)
HDLC SERPL-MCNC: 66 MG/DL
HGB BLD-MCNC: 15.1 G/DL (ref 11.1–15.9)
IMM GRANULOCYTES # BLD AUTO: 0.1 X10E3/UL (ref 0–0.1)
IMM GRANULOCYTES NFR BLD AUTO: 1 %
LDLC SERPL CALC-MCNC: 124 MG/DL (ref 0–99)
LYMPHOCYTES # BLD AUTO: 1.3 X10E3/UL (ref 0.7–3.1)
LYMPHOCYTES NFR BLD AUTO: 15 %
MCH RBC QN AUTO: 30.6 PG (ref 26.6–33)
MCHC RBC AUTO-ENTMCNC: 32.4 G/DL (ref 31.5–35.7)
MCV RBC AUTO: 94 FL (ref 79–97)
MONOCYTES # BLD AUTO: 0.7 X10E3/UL (ref 0.1–0.9)
MONOCYTES NFR BLD AUTO: 7 %
NEUTROPHILS # BLD AUTO: 6.7 X10E3/UL (ref 1.4–7)
NEUTROPHILS NFR BLD AUTO: 74 %
PLATELET # BLD AUTO: 334 X10E3/UL (ref 150–450)
POTASSIUM SERPL-SCNC: 4.8 MMOL/L (ref 3.5–5.2)
PROT SERPL-MCNC: 7.8 G/DL (ref 6–8.5)
RBC # BLD AUTO: 4.94 X10E6/UL (ref 3.77–5.28)
SODIUM SERPL-SCNC: 141 MMOL/L (ref 134–144)
T4 FREE SERPL-MCNC: 0.89 NG/DL (ref 0.82–1.77)
TRIGL SERPL-MCNC: 154 MG/DL (ref 0–149)
TSH SERPL DL<=0.005 MIU/L-ACNC: 15.3 UIU/ML (ref 0.45–4.5)
VLDLC SERPL CALC-MCNC: 27 MG/DL (ref 5–40)
WBC # BLD AUTO: 8.9 X10E3/UL (ref 3.4–10.8)

## 2025-03-27 PROCEDURE — 93296 REM INTERROG EVL PM/IDS: CPT | Performed by: STUDENT IN AN ORGANIZED HEALTH CARE EDUCATION/TRAINING PROGRAM

## 2025-03-27 PROCEDURE — 93295 DEV INTERROG REMOTE 1/2/MLT: CPT | Performed by: STUDENT IN AN ORGANIZED HEALTH CARE EDUCATION/TRAINING PROGRAM

## 2025-04-02 ENCOUNTER — LAB (OUTPATIENT)
Dept: FAMILY MEDICINE CLINIC | Facility: CLINIC | Age: 60
End: 2025-04-02
Payer: COMMERCIAL

## 2025-04-09 RX ORDER — DAPAGLIFLOZIN 10 MG/1
1 TABLET, FILM COATED ORAL DAILY
Qty: 90 TABLET | Refills: 0 | Status: SHIPPED | OUTPATIENT
Start: 2025-04-09

## 2025-04-30 ENCOUNTER — TELEPHONE (OUTPATIENT)
Dept: CARDIOLOGY | Facility: CLINIC | Age: 60
End: 2025-04-30
Payer: COMMERCIAL

## 2025-04-30 NOTE — TELEPHONE ENCOUNTER
Caller: Smiley Sibley    Relationship to patient: Self    Best call back number: [phone]    Chief complaint: PATIENT CONCERNED WITH BLOOD WORK SHE HAD DONE AND WOULD LIKE TO DISCUSS WITH PROVIDER.     Type of visit: FU    Requested date: MONDAYS, WEDNESDAYS, OR FRIDAYS

## 2025-05-05 ENCOUNTER — OFFICE VISIT (OUTPATIENT)
Dept: PULMONOLOGY | Facility: CLINIC | Age: 60
End: 2025-05-05
Payer: COMMERCIAL

## 2025-05-05 VITALS
HEART RATE: 69 BPM | DIASTOLIC BLOOD PRESSURE: 64 MMHG | BODY MASS INDEX: 25.33 KG/M2 | OXYGEN SATURATION: 98 % | TEMPERATURE: 97.5 F | WEIGHT: 152 LBS | SYSTOLIC BLOOD PRESSURE: 112 MMHG | HEIGHT: 65 IN

## 2025-05-05 DIAGNOSIS — A31.0 MYCOBACTERIUM AVIUM COMPLEX: Primary | ICD-10-CM

## 2025-05-05 DIAGNOSIS — R06.02 SOB (SHORTNESS OF BREATH): ICD-10-CM

## 2025-05-05 DIAGNOSIS — R59.0 MEDIASTINAL LYMPHADENOPATHY: ICD-10-CM

## 2025-05-05 DIAGNOSIS — I48.92 ATRIAL FLUTTER, UNSPECIFIED TYPE: ICD-10-CM

## 2025-05-05 PROCEDURE — 99214 OFFICE O/P EST MOD 30 MIN: CPT | Performed by: INTERNAL MEDICINE

## 2025-05-05 NOTE — PROGRESS NOTES
"Follow Up Office Note       Patient Name: Smiley Sibley    Referring Physician: No ref. provider found    Chief Complaint:    Chief Complaint   Patient presents with    Shortness of Breath     F/u       History of Present Illness: Smiley Sibley is a 59 y.o. female who is here today to follow-up care with Pulmonary.  Patient has a past medical history significant for chronic diastolic heart failure, atrial flutter, hypertrophic obstructive cardiomyopathy with ICD in place, neurocardiogenic syncope, anxiety depression, and SVT.  Patient is doing very well right now.  She had some issues with her A-fib where they took her off of her Stiolto and on amiodarone earlier in the year has had no issues since then.  As far as pulmonary symptoms go she has none.    Review of Systems:   Review of Systems   Constitutional:  Negative for chills, fatigue and fever.   HENT:  Negative for congestion and voice change.    Eyes:  Negative for blurred vision.   Respiratory:  Negative for cough, shortness of breath and wheezing.    Cardiovascular:  Negative for chest pain.   Skin:  Negative for dry skin.   Hematological:  Negative for adenopathy.   Psychiatric/Behavioral:  Negative for agitation and depressed mood.        The following portions of the patient's history were reviewed and updated as appropriate: allergies, current medications, past family history, past medical history, past social history, past surgical history and problem list.    Physical Exam:  Vital Signs:   Vitals:    05/05/25 1220   BP: 112/64   BP Location: Left arm   Patient Position: Sitting   Cuff Size: Adult   Pulse: 69   Temp: 97.5 °F (36.4 °C)   TempSrc: Infrared   SpO2: 98%  Comment: Room air at rest   Weight: 68.9 kg (152 lb)   Height: 165.1 cm (65\")         Physical Exam  Vitals and nursing note reviewed.   Constitutional:       General: She is not in acute distress.     Appearance: She is well-developed and normal weight. She is not ill-appearing or " toxic-appearing.   HENT:      Head: Normocephalic and atraumatic.   Cardiovascular:      Rate and Rhythm: Normal rate and regular rhythm.      Pulses: Normal pulses.      Heart sounds: Normal heart sounds. No murmur heard.     No friction rub. No gallop.   Pulmonary:      Effort: Pulmonary effort is normal. No respiratory distress.      Breath sounds: Normal breath sounds. No wheezing, rhonchi or rales.   Musculoskeletal:      Right lower leg: No edema.      Left lower leg: No edema.   Skin:     General: Skin is warm and dry.   Neurological:      Mental Status: She is alert and oriented to person, place, and time.         Immunization History   Administered Date(s) Administered    COVID-19 (MODERNA) 1st,2nd,3rd Dose Monovalent 02/23/2021, 03/24/2021    COVID-19 (MODERNA) BIVALENT 12+YRS 10/22/2022    COVID-19 (MODERNA) Monovalent Original Booster 11/15/2021    COVID-19 (PFIZER) 12YRS+ (COMIRNATY) 10/17/2023, 11/01/2024    Flublok 18+yrs 11/15/2021, 10/22/2022, 10/17/2023    Fluzone (or Fluarix & Flulaval for VFC) >6mos 09/19/2016, 10/17/2017, 09/30/2020    Hepatitis A 09/30/2019    Influenza recombinant 11/01/2024    Pneumococcal Conjugate 13-Valent (PCV13) 09/19/2016    Pneumococcal Conjugate 20-Valent (PCV20) 03/12/2025    TD Preservative Free (Tenivac) 03/12/2025       Results Review:   -I personally reviewed the patient's chest x-ray from 5/5/2025 showing no acute cardiopulmonary process.  - CT of the chest from 3/15/2023, that shows significant mediastinal and hilar lymphadenopathy, in addition to that there is a left upper lobe noncalcified nodule measuring 1.2 cm.              - chest x-ray on 11/29/2022 shows mild cardiac enlargement, with no acute cardiopulmonary findings.  - PFT from 11/8/2022 showed mild obstruction without restriction and normal DLCO, no significant bronchodilator response was seen.  - pulmonary function testing from 11/29/2020 which showed no obstruction or restriction with a normal  DLCO.  -cytology results from bronchoscopy with EBUS on 3/30/2023, cytology was all negative for any malignant cells, also no signs of any granulomas, flow cytometry was negative as well  -Personally reviewed the patient's culture results from bronchoscopy on 3/30/2023 which showed the fungus tissue culture, bacterial tissue culture, and AFB tissue culture all negative, but the AFB on the BAL was positive for Mycobacterium, other cultures otherwise negative.  - Echo report from 6/11/2021 showed a EF of 50%, left ventricular wall thickness consistent with moderate to severe septal asymmetric hypertrophy, left ventricular mass is increased, findings consistent with nonobstructive hypertrophic cardiomyopathy, diastolic heart failure, right atrial cavity moderately dilated, left atrial cavity moderately dilated, RVSP elevated at 55 mmHg.    Assessment / Plan:   Diagnoses and all orders for this visit:    1. Mycobacterium avium complex (Primary)  2. Mediastinal lymphadenopathy  - Her disease appears to be secondary to Mycobacterium.  But has gradually improved.  X-ray shows no abnormality today.  Will plan for repeat CT scan in 1 year.  Unless she becomes symptomatic we are not going to treat her for Mycobacterium.    3. Atrial flutter, unspecified type  - She is doing much better since starting on the amiodarone.  Continue with cardiology for management.      Follow Up:   Return in about 1 year (around 5/5/2026) for CT Chest with next visit.       RULA Thompson,   Pulmonary and Critical Care Medicine  Note Electronically Signed    Part of this note may be an electronic transcription/translation of spoken language to printed text using the Dragon Dictation System.

## 2025-06-11 NOTE — ANESTHESIA PROCEDURE NOTES
Airway  Urgency: elective    Date/Time: 3/30/2023 12:22 PM  Airway not difficult    General Information and Staff    Patient location during procedure: OR  CRNA/CAA: Logan Roman CRNA    Indications and Patient Condition  Indications for airway management: airway protection    Preoxygenated: yes  MILS not maintained throughout  Mask difficulty assessment: 1 - vent by mask    Final Airway Details  Final airway type: endotracheal airway      Successful airway: ETT  Cuffed: yes   Successful intubation technique: video laryngoscopy  Endotracheal tube insertion site: oral  Blade: Dickson  Blade size: 3  ETT size (mm): 8.5  Cormack-Lehane Classification: grade I - full view of glottis  Placement verified by: chest auscultation and capnometry   Cuff volume (mL): 10  Measured from: lips  ETT/EBT  to lips (cm): 22  Number of attempts at approach: 1  Assessment: lips, teeth, and gum same as pre-op and atraumatic intubation    Additional Comments  Negative epigastric sounds, Breath sound equal bilaterally with symmetric chest rise and fall         Patient is over due for refill.     Medication failed protocol.    Medication: Zolpidem   Medication Refill Protocol Failed.      Controlled Substances Refill Protocol - 12 Month Protocol - ALWAYS forward to ordering provider Tzcspf25/10/2025 06:28 PM   Protocol Details FORWARD TO PROVIDER - Refill requests for these medications must ALWAYS be forwarded to the ordering provider, even if all criteria are met    PDMP has been reviewed in last 24 hours    Active/up-to-date controlled substances agreement/consent on file        Last office visit date: 06/02/2025  Next appointment scheduled?: No;

## 2025-06-26 ENCOUNTER — TELEPHONE (OUTPATIENT)
Dept: CARDIOLOGY | Facility: CLINIC | Age: 60
End: 2025-06-26
Payer: COMMERCIAL

## 2025-06-26 NOTE — TELEPHONE ENCOUNTER
Missed scheduled [employer] remote ICD transmission for today, 6/26/2025. Per chart review, pt underwent a PVA @ [facility] today, 6/26/2025. Merlin home monitor last communicated with the web site on 6/20/2025. Will see if transmission received when pt returns home.

## 2025-06-30 LAB
MC_CV_MDC_IDC_RATE_1: 160
MC_CV_MDC_IDC_RATE_1: 240
MC_CV_MDC_IDC_SHOCK_MEASURED_IMPEDANCE: 39
MC_CV_MDC_IDC_THERAPIES: NORMAL
MC_CV_MDC_IDC_ZONE_ID: 1
MC_CV_MDC_IDC_ZONE_ID: 2
MC_CV_MDC_IDC_ZONE_ID: 3
MDC_IDC_MSMT_BATTERY_REMAINING_LONGEVITY: 38 MO
MDC_IDC_MSMT_BATTERY_REMAINING_PERCENTAGE: 43 %
MDC_IDC_MSMT_BATTERY_RRT_TRIGGER: 2.59
MDC_IDC_MSMT_BATTERY_STATUS: NORMAL
MDC_IDC_MSMT_BATTERY_VOLTAGE: 2.93
MDC_IDC_MSMT_CAP_CHARGE_TIME: 9
MDC_IDC_MSMT_LEADCHNL_RA_IMPEDANCE_VALUE: 390
MDC_IDC_MSMT_LEADCHNL_RA_PACING_THRESHOLD_POLARITY: NORMAL
MDC_IDC_MSMT_LEADCHNL_RA_SENSING_INTR_AMPL: 3.3
MDC_IDC_MSMT_LEADCHNL_RV_IMPEDANCE_VALUE: 380
MDC_IDC_MSMT_LEADCHNL_RV_PACING_THRESHOLD_POLARITY: NORMAL
MDC_IDC_MSMT_LEADCHNL_RV_SENSING_INTR_AMPL: 5.3
MDC_IDC_PG_IMPLANT_DTM: NORMAL
MDC_IDC_PG_MFG: NORMAL
MDC_IDC_PG_MODEL: NORMAL
MDC_IDC_PG_SERIAL: NORMAL
MDC_IDC_PG_TYPE: NORMAL
MDC_IDC_SESS_DTM: NORMAL
MDC_IDC_SESS_TYPE: NORMAL
MDC_IDC_SET_BRADY_AT_MODE_SWITCH_RATE: 180
MDC_IDC_SET_BRADY_LOWRATE: 60
MDC_IDC_SET_BRADY_MAX_SENSOR_RATE: 120
MDC_IDC_SET_BRADY_MAX_TRACKING_RATE: 95
MDC_IDC_SET_BRADY_MODE: NORMAL
MDC_IDC_SET_BRADY_PAV_DELAY: 250
MDC_IDC_SET_BRADY_SAV_DELAY: 250
MDC_IDC_SET_LEADCHNL_RA_PACING_AMPLITUDE: 2
MDC_IDC_SET_LEADCHNL_RA_PACING_POLARITY: NORMAL
MDC_IDC_SET_LEADCHNL_RA_PACING_PULSEWIDTH: 0.5
MDC_IDC_SET_LEADCHNL_RA_SENSING_POLARITY: NORMAL
MDC_IDC_SET_LEADCHNL_RA_SENSING_SENSITIVITY: 0.3
MDC_IDC_SET_LEADCHNL_RV_PACING_AMPLITUDE: 2
MDC_IDC_SET_LEADCHNL_RV_PACING_POLARITY: NORMAL
MDC_IDC_SET_LEADCHNL_RV_PACING_PULSEWIDTH: 0.5
MDC_IDC_SET_LEADCHNL_RV_SENSING_POLARITY: NORMAL
MDC_IDC_SET_LEADCHNL_RV_SENSING_SENSITIVITY: 0.5
MDC_IDC_SET_ZONE_STATUS: NORMAL
MDC_IDC_SET_ZONE_TYPE: NORMAL
MDC_IDC_STAT_AT_BURDEN_PERCENT: 1
MDC_IDC_STAT_BRADY_RA_PERCENT_PACED: 87
MDC_IDC_STAT_BRADY_RV_PERCENT_PACED: 47
MDC_IDC_STAT_TACHYTHERAPY_ATP_DELIVERED_RECENT: 0
MDC_IDC_STAT_TACHYTHERAPY_SHOCKS_ABORTED_RECENT: 0
MDC_IDC_STAT_TACHYTHERAPY_SHOCKS_DELIVERED_RECENT: 0

## 2025-07-02 ENCOUNTER — TELEPHONE (OUTPATIENT)
Dept: CARDIOLOGY | Facility: CLINIC | Age: 60
End: 2025-07-02
Payer: COMMERCIAL

## 2025-07-02 NOTE — TELEPHONE ENCOUNTER
I submitted a PA for Farxiga 10 mg PO daily through Cover My Meds.            ADRIANA MENG (Ye: [id number])      Sandra has not yet replied to your PA request. Depending on the information you've provided, additional questions may be returned by the plan. You may close this dialog, return to your dashboard, and perform other tasks.    To check for an update later, open this request again from your dashboard.    If Sandra has not replied to your request within 24 hours please contact Sandra at [phone].

## 2025-07-07 RX ORDER — DAPAGLIFLOZIN 10 MG/1
1 TABLET, FILM COATED ORAL DAILY
Qty: 90 TABLET | Refills: 0 | Status: SHIPPED | OUTPATIENT
Start: 2025-07-07

## 2025-07-14 ENCOUNTER — HOSPITAL ENCOUNTER (EMERGENCY)
Facility: HOSPITAL | Age: 60
Discharge: HOME | End: 2025-07-14
Attending: EMERGENCY MEDICINE | Admitting: EMERGENCY MEDICINE
Payer: COMMERCIAL

## 2025-07-14 ENCOUNTER — OFFICE VISIT (OUTPATIENT)
Dept: FAMILY MEDICINE CLINIC | Facility: CLINIC | Age: 60
End: 2025-07-14
Payer: COMMERCIAL

## 2025-07-14 VITALS
OXYGEN SATURATION: 100 % | RESPIRATION RATE: 18 BRPM | WEIGHT: 147 LBS | DIASTOLIC BLOOD PRESSURE: 69 MMHG | TEMPERATURE: 98.8 F | HEIGHT: 65 IN | HEART RATE: 60 BPM | SYSTOLIC BLOOD PRESSURE: 113 MMHG | BODY MASS INDEX: 24.49 KG/M2

## 2025-07-14 VITALS
WEIGHT: 147 LBS | DIASTOLIC BLOOD PRESSURE: 60 MMHG | HEART RATE: 66 BPM | OXYGEN SATURATION: 98 % | HEIGHT: 65 IN | SYSTOLIC BLOOD PRESSURE: 108 MMHG | BODY MASS INDEX: 24.49 KG/M2

## 2025-07-14 DIAGNOSIS — R50.9 FEVER, UNSPECIFIED FEVER CAUSE: ICD-10-CM

## 2025-07-14 DIAGNOSIS — R10.31 RLQ ABDOMINAL PAIN: Primary | ICD-10-CM

## 2025-07-14 LAB
ALBUMIN SERPL-MCNC: 4.3 G/DL (ref 3.5–5.2)
ALBUMIN/GLOB SERPL: 1.2 G/DL
ALP SERPL-CCNC: 101 U/L (ref 39–117)
ALT SERPL W P-5'-P-CCNC: 28 U/L (ref 1–33)
ANION GAP SERPL CALCULATED.3IONS-SCNC: 11.3 MMOL/L (ref 5–15)
AST SERPL-CCNC: 32 U/L (ref 1–32)
BACTERIA UR QL AUTO: ABNORMAL /HPF
BASOPHILS # BLD AUTO: 0.02 10*3/MM3 (ref 0–0.2)
BASOPHILS NFR BLD AUTO: 0.3 % (ref 0–1.5)
BILIRUB SERPL-MCNC: 0.7 MG/DL (ref 0–1.2)
BILIRUB UR QL STRIP: NEGATIVE
BUN SERPL-MCNC: 15.6 MG/DL (ref 6–20)
BUN/CREAT SERPL: 13.2 (ref 7–25)
CALCIUM SPEC-SCNC: 9.7 MG/DL (ref 8.6–10.5)
CHLORIDE SERPL-SCNC: 103 MMOL/L (ref 98–107)
CLARITY UR: CLEAR
CO2 SERPL-SCNC: 24.7 MMOL/L (ref 22–29)
COLOR UR: YELLOW
CREAT SERPL-MCNC: 1.18 MG/DL (ref 0.57–1)
D-LACTATE SERPL-SCNC: 1.3 MMOL/L (ref 0.5–2)
DEPRECATED RDW RBC AUTO: 52.5 FL (ref 37–54)
EGFRCR SERPLBLD CKD-EPI 2021: 53.6 ML/MIN/1.73
EOSINOPHIL # BLD AUTO: 0.09 10*3/MM3 (ref 0–0.4)
EOSINOPHIL NFR BLD AUTO: 1.2 % (ref 0.3–6.2)
ERYTHROCYTE [DISTWIDTH] IN BLOOD BY AUTOMATED COUNT: 14.4 % (ref 12.3–15.4)
GLOBULIN UR ELPH-MCNC: 3.5 GM/DL
GLUCOSE SERPL-MCNC: 118 MG/DL (ref 65–99)
GLUCOSE UR STRIP-MCNC: ABNORMAL MG/DL
HCT VFR BLD AUTO: 45 % (ref 34–46.6)
HGB BLD-MCNC: 14.3 G/DL (ref 12–15.9)
HGB UR QL STRIP.AUTO: ABNORMAL
HOLD SPECIMEN: NORMAL
HOLD SPECIMEN: NORMAL
HYALINE CASTS UR QL AUTO: ABNORMAL /LPF
IMM GRANULOCYTES # BLD AUTO: 0.04 10*3/MM3 (ref 0–0.05)
IMM GRANULOCYTES NFR BLD AUTO: 0.5 % (ref 0–0.5)
KETONES UR QL STRIP: NEGATIVE
LEUKOCYTE ESTERASE UR QL STRIP.AUTO: ABNORMAL
LIPASE SERPL-CCNC: 33 U/L (ref 13–60)
LYMPHOCYTES # BLD AUTO: 1.38 10*3/MM3 (ref 0.7–3.1)
LYMPHOCYTES NFR BLD AUTO: 18 % (ref 19.6–45.3)
MCH RBC QN AUTO: 31.4 PG (ref 26.6–33)
MCHC RBC AUTO-ENTMCNC: 31.8 G/DL (ref 31.5–35.7)
MCV RBC AUTO: 98.7 FL (ref 79–97)
MONOCYTES # BLD AUTO: 0.75 10*3/MM3 (ref 0.1–0.9)
MONOCYTES NFR BLD AUTO: 9.8 % (ref 5–12)
NEUTROPHILS NFR BLD AUTO: 5.4 10*3/MM3 (ref 1.7–7)
NEUTROPHILS NFR BLD AUTO: 70.2 % (ref 42.7–76)
NITRITE UR QL STRIP: NEGATIVE
NRBC BLD AUTO-RTO: 0 /100 WBC (ref 0–0.2)
PH UR STRIP.AUTO: 5.5 [PH] (ref 5–8)
PLATELET # BLD AUTO: 333 10*3/MM3 (ref 140–450)
PMV BLD AUTO: 9.9 FL (ref 6–12)
POTASSIUM SERPL-SCNC: 4.4 MMOL/L (ref 3.5–5.2)
PROT SERPL-MCNC: 7.8 G/DL (ref 6–8.5)
PROT UR QL STRIP: NEGATIVE
RBC # BLD AUTO: 4.56 10*6/MM3 (ref 3.77–5.28)
RBC # UR STRIP: ABNORMAL /HPF
REF LAB TEST METHOD: ABNORMAL
SODIUM SERPL-SCNC: 139 MMOL/L (ref 136–145)
SP GR UR STRIP: >1.03 (ref 1–1.03)
SQUAMOUS #/AREA URNS HPF: ABNORMAL /HPF
UROBILINOGEN UR QL STRIP: ABNORMAL
WBC # UR STRIP: ABNORMAL /HPF
WBC NRBC COR # BLD AUTO: 7.68 10*3/MM3 (ref 3.4–10.8)
WHOLE BLOOD HOLD COAG: NORMAL
WHOLE BLOOD HOLD SPECIMEN: NORMAL

## 2025-07-14 PROCEDURE — 83605 ASSAY OF LACTIC ACID: CPT

## 2025-07-14 PROCEDURE — 36415 COLL VENOUS BLD VENIPUNCTURE: CPT

## 2025-07-14 PROCEDURE — 99283 EMERGENCY DEPT VISIT LOW MDM: CPT

## 2025-07-14 PROCEDURE — 80053 COMPREHEN METABOLIC PANEL: CPT

## 2025-07-14 PROCEDURE — 83690 ASSAY OF LIPASE: CPT

## 2025-07-14 PROCEDURE — 85025 COMPLETE CBC W/AUTO DIFF WBC: CPT

## 2025-07-14 PROCEDURE — 99214 OFFICE O/P EST MOD 30 MIN: CPT | Performed by: STUDENT IN AN ORGANIZED HEALTH CARE EDUCATION/TRAINING PROGRAM

## 2025-07-14 PROCEDURE — 81001 URINALYSIS AUTO W/SCOPE: CPT

## 2025-07-14 RX ORDER — SODIUM CHLORIDE 9 MG/ML
10 INJECTION, SOLUTION INTRAMUSCULAR; INTRAVENOUS; SUBCUTANEOUS AS NEEDED
Status: DISCONTINUED | OUTPATIENT
Start: 2025-07-14 | End: 2025-07-14 | Stop reason: HOSPADM

## 2025-07-14 RX ADMIN — AMOXICILLIN AND CLAVULANATE POTASSIUM 1 TABLET: 875; 125 TABLET, FILM COATED ORAL at 16:43

## 2025-07-14 NOTE — PROGRESS NOTES
"Chief Complaint  Abdominal Pain (RLQ pain x5 days)    Subjective          Smiley Sibley presents to [facility]  Abdominal Pain  Chronicity:  New  Onset:  In the past 7 days (Started on 7/11/25)  Onset quality:  Sudden  Frequency:  Constantly  Progression since onset:  Coming and going  Pain location:  RLQ  Pain quality:  Sharp  Radiates to:  Does not radiate  Associated symptoms: no constipation, no diarrhea, no melena, no nausea and no vomiting    Aggravated by:  Coughing, movement and certain positions  Relieved by:  Nothing  Treatments tried:  Nothing          Objective   Vital Signs:   /60   Pulse 66   Ht 165.1 cm (65\")   Wt 66.7 kg (147 lb)   SpO2 98%   BMI 24.46 kg/m²     Body mass index is 24.46 kg/m².    Review of Systems   Gastrointestinal:  Positive for abdominal pain. Negative for constipation, diarrhea, melena, nausea and vomiting.       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   Surgical History: has a past surgical history that includes Dilation and curettage of uterus; Hysterotomy; Cardiac electrophysiology procedure (N/A, 09/13/2018); Ablation of dysrhythmic focus (09/13/2018); Cardiac catheterization (2010); Insert / replace / remove pacemaker (07/29/2010); Cardiac defibrillator placement (07/29/2010); Colonoscopy; Cardiac electrophysiology procedure (N/A, " 07/20/2020); Bronchoscopy (N/A, 03/30/2023); Endometrial ablation; and Bronchoscopy (3/30/23).   Family History: family history includes Arrhythmia in her brother, brother, and mother; Cancer in her father and mother; Diabetes in her father; Heart disease in her brother, brother, and mother; Heart failure in her mother; Hyperlipidemia in her father; Hypertension in her father.   Social History: reports that she has never smoked. She has never been exposed to tobacco smoke. She has never used smokeless tobacco. She reports that she does not currently use alcohol after a past usage of about 1.0 - 2.0 standard drink of alcohol per week. She reports that she does not use drugs.      Current Outpatient Medications:     amiodarone (PACERONE) 200 MG tablet, TAKE 1 TABLET BY MOUTH DAILY, Disp: 14 tablet, Rfl: 0    Eliquis 5 MG tablet tablet, TAKE ONE TABLET BY MOUTH EVERY 12 HOURS, Disp: 180 tablet, Rfl: 2    Farxiga 10 MG tablet, TAKE 1 TABLET BY MOUTH DAILY, Disp: 90 tablet, Rfl: 0    furosemide (LASIX) 20 MG tablet, Take 1 tablet by mouth Daily., Disp: , Rfl:     sacubitril-valsartan (Entresto) 24-26 MG tablet, Take 1 tablet by mouth Daily., Disp: , Rfl:   No current facility-administered medications for this visit.    Facility-Administered Medications Ordered in Other Visits:     Sodium Chloride (PF) 0.9 % 10 mL, 10 mL, Intravenous, PRN, Emergency, Triage Protocol, MD    Allergies: Patient has no known allergies.    Physical Exam  Constitutional:       General: She is not in acute distress.     Appearance: She is not ill-appearing or toxic-appearing.   HENT:      Head: Normocephalic and atraumatic.   Cardiovascular:      Rate and Rhythm: Normal rate and regular rhythm.      Heart sounds: No murmur heard.  Pulmonary:      Effort: Pulmonary effort is normal. No respiratory distress.   Abdominal:      Tenderness: There is abdominal tenderness (RLQ). There is guarding and rebound.   Neurological:      General: No focal  deficit present.      Mental Status: She is alert and oriented to person, place, and time.   Psychiatric:         Mood and Affect: Mood normal.         Thought Content: Thought content normal.          Result Review :                   Assessment and Plan    Diagnoses and all orders for this visit:    1. RLQ abdominal pain (Primary)  -     CT Abdomen Pelvis Without Contrast; Future    2. Fever, unspecified fever cause  -     CT Abdomen Pelvis Without Contrast; Future    CT abdomen concerning for possible appendicitis. Patient referred to ED for further evaluation. She is agreeable to this.       Follow Up   No follow-ups on file.  Patient was given instructions and counseling regarding her condition or for health maintenance advice. Please see specific information pulled into the AVS if appropriate.     Coral Granda, DO

## 2025-07-15 NOTE — ED PROVIDER NOTES
"Subjective   History of Present Illness  59-year-old female presents for evaluation of \"possible appendicitis.\"  The patient tells me that 4 days ago she began experiencing right lower quadrant abdominal pain.  Initially, she had accompanying nausea as well.  She notes that her symptoms improved throughout the weekend and she notes that she has had a normal appetite as well.  However, she has had persistent discomfort to her right lower quadrant region and as a result saw her primary care physician who ordered an outpatient CT scan earlier today.  She was then sent here to be evaluated for \"possible appendicitis\" after her primary care physician reviewed her CT report.  She currently rates her pain at 4 out of 10 in severity.  The pain is worse with palpation.  She notes a normal appetite.  No fevers.  No urinary symptoms.  She has no other complaints at this time.      Review of Systems   Gastrointestinal:  Positive for abdominal pain and nausea.   All other systems reviewed and are negative.      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     Sleep apnea 2013    Sleep apnea, obstructive 3/29/13    Valvular disease 1998       No Known Allergies    Past Surgical History:   Procedure Laterality Date    ABLATION OF DYSRHYTHMIC FOCUS  09/13/2018    BRONCHOSCOPY N/A 03/30/2023    Procedure: BRONCHOSCOPY WITH ENDOBRONCHIAL ULTRASOUND;  Surgeon: Terrence Thompson DO;  Location: [facility] ENDOSCOPY;  Service: " Pulmonary;  Laterality: N/A;    BRONCHOSCOPY  3/30/23    CARDIAC CATHETERIZATION  2010    CARDIAC DEFIBRILLATOR PLACEMENT  07/29/2010    CARDIAC ELECTROPHYSIOLOGY PROCEDURE N/A 09/13/2018    Procedure: Ablation SVT;  Surgeon: Maximino Nassar DO;  Location:  [facility];  Service: Cardiovascular    CARDIAC ELECTROPHYSIOLOGY PROCEDURE N/A 07/20/2020    Procedure: ICD DC generator change-  3-4 weeks SJ;  Surgeon: Sanya Stiles MD;  Location:  [facility];  Service: Cardiology;  Laterality: N/A;    COLONOSCOPY      DILATATION AND CURETTAGE      ENDOMETRIAL ABLATION      HYSTEROTOMY      INSERT / REPLACE / REMOVE PACEMAKER  07/29/2010       Family History   Problem Relation Age of Onset    Arrhythmia Brother     Heart disease Brother     Arrhythmia Mother     Heart disease Mother     Heart failure Mother     Cancer Mother         Colon cancer    Hyperlipidemia Father     Hypertension Father     Cancer Father         Prostate cancer    Diabetes Father     Heart disease Brother     Arrhythmia Brother        Social History     Socioeconomic History    Marital status:    Tobacco Use    Smoking status: Never     Passive exposure: Never    Smokeless tobacco: Never   Vaping Use    Vaping status: Never Used   Substance and Sexual Activity    Alcohol use: Not Currently     Alcohol/week: 1.0 - 2.0 standard drink of alcohol     Comment: 3-4 times/year    Drug use: No    Sexual activity: Yes     Partners: Male     Birth control/protection: Surgical           Objective   Physical Exam  Vitals and nursing note reviewed.   Constitutional:       General: She is not in acute distress.     Appearance: She is well-developed. She is not diaphoretic.      Comments: Very well-appearing female in no acute distress   HENT:      Head: Normocephalic and atraumatic.   Eyes:      Pupils: Pupils are equal, round, and reactive to light.   Cardiovascular:      Rate and Rhythm: Normal rate and regular rhythm.  "     Heart sounds: Normal heart sounds. No murmur heard.     No friction rub. No gallop.   Pulmonary:      Effort: Pulmonary effort is normal. No respiratory distress.      Breath sounds: Normal breath sounds. No wheezing or rales.   Abdominal:      General: Bowel sounds are normal. There is no distension.      Palpations: Abdomen is soft. There is no mass.      Tenderness: There is abdominal tenderness. There is no guarding or rebound.      Comments: Mild tenderness noted with palpation of right lower quadrant, no peritoneal signs, no pain out of proportion to exam, negative Rovsing's sign   Genitourinary:     Comments: No CVA tenderness noted  Musculoskeletal:         General: Normal range of motion.      Cervical back: Neck supple.   Skin:     General: Skin is warm and dry.      Findings: No erythema or rash.      Comments: No dermatomal rash present   Neurological:      Mental Status: She is alert and oriented to person, place, and time.   Psychiatric:         Mood and Affect: Mood normal.         Thought Content: Thought content normal.         Judgment: Judgment normal.         Procedures           ED Course  ED Course as of 07/14/25 2129 Mon Jul 14, 2025 2127 59-year-old female presents for evaluation of \"possible appendicitis.\"  The patient tells me that 4 days ago she began experiencing right lower quadrant abdominal pain.  Initially, she had accompanying nausea as well.  She notes that her symptoms improved throughout the weekend and she notes a normal appetite as well.  However, she had persistent discomfort and as a result saw her primary care physician who ordered an outpatient CT scan which was performed earlier today.  She was then sent here to be evaluated for \"possible appendicitis.\"  On arrival to the ED, the patient is nontoxic-appearing.  She has focal right lower quadrant abdominal tenderness present.  No peritoneal signs or pain out of proportion to exam.  No dermatomal rash noted.  No CVA " tenderness noted.  Labs interpreted independently by me are bland/unrevealing. [DD]   2128 Before evaluating the patient, I reviewed her outpatient CT scan from earlier today which revealed fat stranding adjacent to the cecum next to the appendix tip. This could represent inflammation of the tip and tip appendicitis though epiploic appendagitis is also in the differential. [DD]   2129 After reviewing the patient's labs and imaging, I discussed the patient's case with Dr. Valentine of general surgery.  Given her well appearance, good appetite, and overall clinical picture, he felt that acute appendicitis was unlikely and is recommending outpatient Augmentin and appropriate strict return precautions.  I discussed this plan with the patient and she is in agreement.  Prescription for Augmentin.  She will follow-up with her primary care physician within the next week.  Agreeable with plan and given appropriate strict return precautions. [DD]      ED Course User Index  [DD] Bhanu Alarocn MD                                           Recent Results (from the past 24 hours)   Comprehensive Metabolic Panel    Collection Time: 07/14/25  3:25 PM    Specimen: Blood   Result Value Ref Range    Glucose 118 (H) 65 - 99 mg/dL    BUN 15.6 6.0 - 20.0 mg/dL    Creatinine 1.18 (H) 0.57 - 1.00 mg/dL    Sodium 139 136 - 145 mmol/L    Potassium 4.4 3.5 - 5.2 mmol/L    Chloride 103 98 - 107 mmol/L    CO2 24.7 22.0 - 29.0 mmol/L    Calcium 9.7 8.6 - 10.5 mg/dL    Total Protein 7.8 6.0 - 8.5 g/dL    Albumin 4.3 3.5 - 5.2 g/dL    ALT (SGPT) 28 1 - 33 U/L    AST (SGOT) 32 1 - 32 U/L    Alkaline Phosphatase 101 39 - 117 U/L    Total Bilirubin 0.7 0.0 - 1.2 mg/dL    Globulin 3.5 gm/dL    A/G Ratio 1.2 g/dL    BUN/Creatinine Ratio 13.2 7.0 - 25.0    Anion Gap 11.3 5.0 - 15.0 mmol/L    eGFR 53.6 (L) >60.0 mL/min/1.73   Lipase    Collection Time: 07/14/25  3:25 PM    Specimen: Blood   Result Value Ref Range    Lipase 33 13 - 60 U/L   Lactic  Acid, Plasma    Collection Time: 07/14/25  3:25 PM    Specimen: Blood   Result Value Ref Range    Lactate 1.3 0.5 - 2.0 mmol/L   Green Top (Gel)    Collection Time: 07/14/25  3:25 PM   Result Value Ref Range    Extra Tube Hold for add-ons.    Lavender Top    Collection Time: 07/14/25  3:25 PM   Result Value Ref Range    Extra Tube hold for add-on    Gold Top - SST    Collection Time: 07/14/25  3:25 PM   Result Value Ref Range    Extra Tube Hold for add-ons.    Light Blue Top    Collection Time: 07/14/25  3:25 PM   Result Value Ref Range    Extra Tube Hold for add-ons.    CBC Auto Differential    Collection Time: 07/14/25  3:25 PM    Specimen: Blood   Result Value Ref Range    WBC 7.68 3.40 - 10.80 10*3/mm3    RBC 4.56 3.77 - 5.28 10*6/mm3    Hemoglobin 14.3 12.0 - 15.9 g/dL    Hematocrit 45.0 34.0 - 46.6 %    MCV 98.7 (H) 79.0 - 97.0 fL    MCH 31.4 26.6 - 33.0 pg    MCHC 31.8 31.5 - 35.7 g/dL    RDW 14.4 12.3 - 15.4 %    RDW-SD 52.5 37.0 - 54.0 fl    MPV 9.9 6.0 - 12.0 fL    Platelets 333 140 - 450 10*3/mm3    Neutrophil % 70.2 42.7 - 76.0 %    Lymphocyte % 18.0 (L) 19.6 - 45.3 %    Monocyte % 9.8 5.0 - 12.0 %    Eosinophil % 1.2 0.3 - 6.2 %    Basophil % 0.3 0.0 - 1.5 %    Immature Grans % 0.5 0.0 - 0.5 %    Neutrophils, Absolute 5.40 1.70 - 7.00 10*3/mm3    Lymphocytes, Absolute 1.38 0.70 - 3.10 10*3/mm3    Monocytes, Absolute 0.75 0.10 - 0.90 10*3/mm3    Eosinophils, Absolute 0.09 0.00 - 0.40 10*3/mm3    Basophils, Absolute 0.02 0.00 - 0.20 10*3/mm3    Immature Grans, Absolute 0.04 0.00 - 0.05 10*3/mm3    nRBC 0.0 0.0 - 0.2 /100 WBC   Urinalysis With Microscopic If Indicated (No Culture) - Urine, Clean Catch    Collection Time: 07/14/25  4:25 PM    Specimen: Urine, Clean Catch   Result Value Ref Range    Color, UA Yellow Yellow, Straw    Appearance, UA Clear Clear    pH, UA 5.5 5.0 - 8.0    Specific Gravity, UA >1.030 (H) 1.005 - 1.030    Glucose, UA >=1000 mg/dL (3+) (A) Negative    Ketones, UA Negative Negative  "   Bilirubin, UA Negative Negative    Blood, UA Small (1+) (A) Negative    Protein, UA Negative Negative    Leuk Esterase, UA Small (1+) (A) Negative    Nitrite, UA Negative Negative    Urobilinogen, UA 2.0 E.U./dL (A) 0.2 - 1.0 E.U./dL   Urinalysis, Microscopic Only - Urine, Clean Catch    Collection Time: 07/14/25  4:25 PM    Specimen: Urine, Clean Catch   Result Value Ref Range    RBC, UA 0-2 None Seen, 0-2 /HPF    WBC, UA 21-50 (A) None Seen, 0-2 /HPF    Bacteria, UA None Seen None Seen /HPF    Squamous Epithelial Cells, UA 0-2 None Seen, 0-2 /HPF    Hyaline Casts, UA None Seen None Seen /LPF    Methodology Automated Microscopy      Note: In addition to lab results from this visit, the labs listed above may include labs taken at another facility or during a different encounter within the last 24 hours. Please correlate lab times with ED admission and discharge times for further clarification of the services performed during this visit.    No orders to display     Vitals:    07/14/25 1503 07/14/25 1517   BP: (!) 190/115 113/69   BP Location: Left arm Left arm   Patient Position: Sitting Lying   Pulse: 119 60   Resp: 20 18   Temp: 98.8 °F (37.1 °C) 98.8 °F (37.1 °C)   TempSrc: Oral Oral   SpO2: 98% 100%   Weight: 90.7 kg (200 lb) 66.7 kg (147 lb)   Height: 162.6 cm (64\") 165.1 cm (65\")     Medications   amoxicillin-clavulanate (AUGMENTIN) 875-125 MG per tablet 1 tablet (1 tablet Oral Given 7/14/25 1643)     ECG/EMG Results (last 24 hours)       ** No results found for the last 24 hours. **          No orders to display                     Medical Decision Making  Problems Addressed:  RLQ abdominal pain: complicated acute illness or injury    Risk  Prescription drug management.        Final diagnoses:   RLQ abdominal pain       ED Disposition  ED Disposition       ED Disposition   Discharge    Condition   Stable    Comment   --               Coral Granda DO  [address] [place] [address] " [id number] [phone]    In 1 week           Medication List        New Prescriptions      amoxicillin-clavulanate 875-125 MG per tablet  Commonly known as: AUGMENTIN  Take 1 tablet by mouth 2 (Two) Times a Day.               Where to Get Your Medications        These medications were sent to [id number] - [place], [place] - [facility] [address] - [phone]  - [phone]   [address] [place] [address]      Phone: [phone]   amoxicillin-clavulanate 875-125 MG per tablet            Bhanu Alarcon MD  07/14/25 4749

## 2025-07-22 ENCOUNTER — OFFICE VISIT (OUTPATIENT)
Dept: FAMILY MEDICINE CLINIC | Facility: CLINIC | Age: 60
End: 2025-07-22
Payer: COMMERCIAL

## 2025-07-22 VITALS
HEIGHT: 65 IN | DIASTOLIC BLOOD PRESSURE: 68 MMHG | OXYGEN SATURATION: 100 % | HEART RATE: 60 BPM | BODY MASS INDEX: 24.99 KG/M2 | WEIGHT: 150 LBS | SYSTOLIC BLOOD PRESSURE: 112 MMHG

## 2025-07-22 DIAGNOSIS — R19.8 SYMPTOMS OF APPENDICITIS: Primary | ICD-10-CM

## 2025-07-22 PROCEDURE — 90471 IMMUNIZATION ADMIN: CPT | Performed by: STUDENT IN AN ORGANIZED HEALTH CARE EDUCATION/TRAINING PROGRAM

## 2025-07-22 PROCEDURE — 99213 OFFICE O/P EST LOW 20 MIN: CPT | Performed by: STUDENT IN AN ORGANIZED HEALTH CARE EDUCATION/TRAINING PROGRAM

## 2025-07-22 PROCEDURE — 90750 HZV VACC RECOMBINANT IM: CPT | Performed by: STUDENT IN AN ORGANIZED HEALTH CARE EDUCATION/TRAINING PROGRAM

## 2025-07-22 NOTE — PROGRESS NOTES
"Chief Complaint  Hospital Follow Up Visit    Subjective          Smiley Sibley presents to [facility]  History of Present Illness    Patient presents the office today for ER follow-up.  She states she was seen in the emergency department last week after her appointment here in the office and subsequent CT scan which showed possible appendicitis.  Patient was evaluated in the ER by both the emergency room physician and general surgery who recommended against urgent appendectomy and treated with Augmentin.  Patient states that she has had significant improvement, and near complete resolution of her symptoms at this time.  She has 1 day of antibiotics left.    Objective   Vital Signs:   /68   Pulse 60   Ht 165.1 cm (65\")   Wt 68 kg (150 lb)   SpO2 100%   BMI 24.96 kg/m²     Body mass index is 24.96 kg/m².    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   Surgical History: has a past surgical history that includes Dilation and curettage of uterus; Hysterotomy; Cardiac electrophysiology procedure (N/A, 09/13/2018); Ablation of dysrhythmic focus (09/13/2018); Cardiac catheterization (2010); Insert / replace / remove pacemaker (07/29/2010); Cardiac defibrillator placement (07/29/2010); Colonoscopy; Cardiac electrophysiology procedure (N/A, 07/20/2020); Bronchoscopy " (N/A, 03/30/2023); Endometrial ablation; and Bronchoscopy (3/30/23).   Family History: family history includes Arrhythmia in her brother, brother, and mother; Cancer in her father and mother; Diabetes in her father; Heart disease in her brother, brother, and mother; Heart failure in her mother; Hyperlipidemia in her father; Hypertension in her father.   Social History: reports that she has never smoked. She has never been exposed to tobacco smoke. She has never used smokeless tobacco. She reports that she does not currently use alcohol after a past usage of about 1.0 - 2.0 standard drink of alcohol per week. She reports that she does not use drugs.      Current Outpatient Medications:     amiodarone (PACERONE) 200 MG tablet, TAKE 1 TABLET BY MOUTH DAILY, Disp: 14 tablet, Rfl: 0    Eliquis 5 MG tablet tablet, TAKE ONE TABLET BY MOUTH EVERY 12 HOURS, Disp: 180 tablet, Rfl: 2    Farxiga 10 MG tablet, TAKE 1 TABLET BY MOUTH DAILY, Disp: 90 tablet, Rfl: 0    furosemide (LASIX) 20 MG tablet, Take 1 tablet by mouth Daily., Disp: , Rfl:     sacubitril-valsartan (Entresto) 24-26 MG tablet, Take 1 tablet by mouth Daily., Disp: , Rfl:     Allergies: Patient has no known allergies.    Physical Exam  Constitutional:       General: She is not in acute distress.     Appearance: She is not ill-appearing or toxic-appearing.   HENT:      Head: Normocephalic and atraumatic.   Cardiovascular:      Rate and Rhythm: Normal rate and regular rhythm.      Heart sounds: No murmur heard.  Pulmonary:      Effort: Pulmonary effort is normal. No respiratory distress.   Abdominal:      Tenderness: There is no abdominal tenderness. There is no guarding or rebound.   Neurological:      General: No focal deficit present.      Mental Status: She is alert and oriented to person, place, and time.   Psychiatric:         Mood and Affect: Mood normal.         Thought Content: Thought content normal.          Result Review :   The following data was  reviewed by: Coral Granda DO on 07/22/2025:    Data reviewed : Recent hospitalization notes ED evaluation from 7/14/2025            Assessment and Plan    Diagnoses and all orders for this visit:    1. Symptoms of appendicitis (Primary)    Other orders  -     Shingrix Vaccine    Patient has had near resolution of her symptoms, advised that if she has any difficulty with medications to contact the office or present back to the emergency department.  She is agreeable to this.    Patient would like Shingrix vaccine today.  This will be performed.    Follow Up   No follow-ups on file.  Patient was given instructions and counseling regarding her condition or for health maintenance advice. Please see specific information pulled into the AVS if appropriate.     Coral Granda DO

## 2025-08-11 ENCOUNTER — TELEPHONE (OUTPATIENT)
Dept: ADMINISTRATIVE | Facility: OTHER | Age: 60
End: 2025-08-11
Payer: COMMERCIAL

## 2025-08-22 ENCOUNTER — PATIENT ROUNDING (BHMG ONLY) (OUTPATIENT)
Age: 60
End: 2025-08-22
Payer: COMMERCIAL

## 2025-08-25 ENCOUNTER — OFFICE VISIT (OUTPATIENT)
Age: 60
End: 2025-08-25
Payer: COMMERCIAL

## 2025-08-25 VITALS
BODY MASS INDEX: 24.69 KG/M2 | SYSTOLIC BLOOD PRESSURE: 110 MMHG | DIASTOLIC BLOOD PRESSURE: 80 MMHG | HEIGHT: 65 IN | WEIGHT: 148.2 LBS

## 2025-08-25 DIAGNOSIS — M62.89 PELVIC FLOOR DYSFUNCTION IN FEMALE: ICD-10-CM

## 2025-08-25 DIAGNOSIS — Z01.419 WELL WOMAN EXAM WITH ROUTINE GYNECOLOGICAL EXAM: Primary | ICD-10-CM

## 2025-08-25 DIAGNOSIS — N94.10 FEMALE DYSPAREUNIA: ICD-10-CM

## 2025-08-25 DIAGNOSIS — N95.2 VAGINAL ATROPHY: ICD-10-CM

## 2025-08-25 PROCEDURE — 99396 PREV VISIT EST AGE 40-64: CPT | Performed by: NURSE PRACTITIONER

## 2025-08-25 RX ORDER — METOPROLOL SUCCINATE 25 MG/1
TABLET, EXTENDED RELEASE ORAL
COMMUNITY
Start: 2025-08-15

## 2025-08-25 RX ORDER — ESTRADIOL 0.1 MG/G
CREAM VAGINAL
Qty: 3 EACH | Refills: 4 | Status: SHIPPED | OUTPATIENT
Start: 2025-08-25

## 2025-08-26 PROBLEM — Z01.419 NORMAL GYNECOLOGIC EXAMINATION: Status: ACTIVE | Noted: 2025-08-26

## 2025-08-26 LAB — REF LAB TEST METHOD: NORMAL

## 2025-08-29 ENCOUNTER — PATIENT ROUNDING (BHMG ONLY) (OUTPATIENT)
Age: 60
End: 2025-08-29
Payer: COMMERCIAL

## (undated) DEVICE — SOLIDIFIER LIQ PREMISORB 1500CC

## (undated) DEVICE — TUBING, SUCTION, 1/4" X 10', STRAIGHT: Brand: MEDLINE

## (undated) DEVICE — INTRAOPERATIVE COVER KIT, 10 PACK: Brand: SITE-RITE

## (undated) DEVICE — INTRO SHEATH ENGAGE W/50 GW .038 9F12

## (undated) DEVICE — LEX ELECTRO PHYSIOLOGY: Brand: MEDLINE INDUSTRIES, INC.

## (undated) DEVICE — PENCL E/S HNDSWCH ROCKRBTN HOLSTR 10FT

## (undated) DEVICE — INTRO SHEATH FASTCATH SAFL .038IN 8.5F 60CM

## (undated) DEVICE — DECANT BG O JET

## (undated) DEVICE — LIMB HOLDER, WRIST/ANKLE: Brand: DEROYAL

## (undated) DEVICE — ADULT, W/LG. BACK PAD, RADIOTRANSPARENT ELEMENT AND LEAD WIRE: Brand: DEFIBRILLATION ELECTRODES

## (undated) DEVICE — SOL NACL 0.9PCT 1000ML

## (undated) DEVICE — TRAP,MUCUS SPECIMEN,40CC: Brand: MEDLINE

## (undated) DEVICE — CANN NASL CO2 DIVIDED A/

## (undated) DEVICE — ST INF PRI SMRTSTE 20DRP 2VLV 24ML 117

## (undated) DEVICE — SOL IRR NACL 0.9PCT BT 1000ML

## (undated) DEVICE — SET PRIMARY GRVTY 10DP MALE LL 104IN

## (undated) DEVICE — SYR SLP TP 10ML DISP

## (undated) DEVICE — IRRIGATOR BULB ASEPTO 60CC STRL

## (undated) DEVICE — CATH QUAD CRD 6F5MM

## (undated) DEVICE — SYR SLPTP 20CC

## (undated) DEVICE — LUER-LOK 360°: Brand: CONNECTA, LUER-LOK

## (undated) DEVICE — Device: Brand: SOUNDSTAR

## (undated) DEVICE — BOWL UTIL STRL 32OZ

## (undated) DEVICE — ST EXT MICROBORE FIX M LL 38IN

## (undated) DEVICE — Device: Brand: BALLOON

## (undated) DEVICE — Device: Brand: SINGLE USE ASPIRATION NEEDLE NA-U401SX

## (undated) DEVICE — INTRO SHEATH ENGAGE W/50 GW .038 7F12

## (undated) DEVICE — Device

## (undated) DEVICE — SYR LUER SLPTP 50ML

## (undated) DEVICE — Device: Brand: MEDEX

## (undated) DEVICE — SAFELINER SUCTION CANISTER 1000CC: Brand: DEROYAL

## (undated) DEVICE — Device: Brand: WEBSTER

## (undated) DEVICE — Device: Brand: REFERENCE PATCH CARTO 3

## (undated) DEVICE — DRSNG SURESITE WNDW 2.38X2.75

## (undated) DEVICE — FIRST STEP BEDSIDE ADD WATER KIT - RESEALABLE STAND-UP POUCH, ENDOSCOPIC CLEANING PAD - 1 POUCH: Brand: FIRST STEP BEDSIDE ADD WATER KIT - RESEALABLE STAND-UP POUCH, ENDOSCOPIC CLEANIN

## (undated) DEVICE — ADAPT SWVL FIBROPTIC BRONCH

## (undated) DEVICE — MEDI-VAC YANKAUER SUCTION HANDLE W/BULBOUS TIP: Brand: CARDINAL HEALTH

## (undated) DEVICE — DRSNG SURG AQUACEL AG 9X15CM

## (undated) DEVICE — ST EXT IV SMARTSITE 2VLV SP M LL 5ML IV1

## (undated) DEVICE — Device: Brand: WEBSTER CS

## (undated) DEVICE — Device: Brand: THERMOCOOL SF NAV

## (undated) DEVICE — SKIN PREP TRAY W/CHG: Brand: MEDLINE INDUSTRIES, INC.

## (undated) DEVICE — PROXIMATE RH ROTATING HEAD SKIN STAPLERS (35 WIDE) CONTAINS 35 STAINLESS STEEL STAPLES: Brand: PROXIMATE

## (undated) DEVICE — ST LINER SAFECAP GRN RED CP STRL

## (undated) DEVICE — LUBE GEL ENDOGLIDE 1.1OZ

## (undated) DEVICE — CONTN GRAD MEAS TRIANG 32OZ BLK

## (undated) DEVICE — SINGLE USE SUCTION VALVE MAJ-209: Brand: SINGLE USE SUCTION VALVE (STERILE)

## (undated) DEVICE — SYRINGE, LUER LOCK, 5ML: Brand: MEDLINE

## (undated) DEVICE — Device: Brand: SMARTABLATE

## (undated) DEVICE — DECANTER: Brand: UNBRANDED

## (undated) DEVICE — SINGLE USE BIOPSY VALVE MAJ-210: Brand: SINGLE USE BIOPSY VALVE (STERILE)